# Patient Record
Sex: MALE | Race: WHITE | NOT HISPANIC OR LATINO | Employment: FULL TIME | URBAN - METROPOLITAN AREA
[De-identification: names, ages, dates, MRNs, and addresses within clinical notes are randomized per-mention and may not be internally consistent; named-entity substitution may affect disease eponyms.]

---

## 2016-12-26 PROCEDURE — 99223 1ST HOSP IP/OBS HIGH 75: CPT | Performed by: HOSPITALIST

## 2016-12-27 PROCEDURE — 99239 HOSP IP/OBS DSCHRG MGMT >30: CPT | Performed by: HOSPITALIST

## 2017-01-30 ENCOUNTER — APPOINTMENT (OUTPATIENT)
Dept: RADIOLOGY | Facility: MEDICAL CENTER | Age: 27
End: 2017-01-30
Attending: EMERGENCY MEDICINE
Payer: COMMERCIAL

## 2017-01-30 ENCOUNTER — HOSPITAL ENCOUNTER (EMERGENCY)
Facility: MEDICAL CENTER | Age: 27
End: 2017-01-31
Attending: EMERGENCY MEDICINE
Payer: COMMERCIAL

## 2017-01-30 DIAGNOSIS — J06.9 UPPER RESPIRATORY TRACT INFECTION, UNSPECIFIED TYPE: ICD-10-CM

## 2017-01-30 DIAGNOSIS — J45.21 MILD INTERMITTENT ASTHMA WITH ACUTE EXACERBATION: Primary | ICD-10-CM

## 2017-01-30 PROCEDURE — 700111 HCHG RX REV CODE 636 W/ 250 OVERRIDE (IP): Performed by: EMERGENCY MEDICINE

## 2017-01-30 PROCEDURE — 94640 AIRWAY INHALATION TREATMENT: CPT

## 2017-01-30 PROCEDURE — 99284 EMERGENCY DEPT VISIT MOD MDM: CPT

## 2017-01-30 PROCEDURE — 700101 HCHG RX REV CODE 250: Performed by: EMERGENCY MEDICINE

## 2017-01-30 PROCEDURE — 96372 THER/PROPH/DIAG INJ SC/IM: CPT

## 2017-01-30 PROCEDURE — 71010 DX-CHEST-PORTABLE (1 VIEW): CPT

## 2017-01-30 RX ORDER — METHYLPREDNISOLONE SODIUM SUCCINATE 125 MG/2ML
125 INJECTION, POWDER, LYOPHILIZED, FOR SOLUTION INTRAMUSCULAR; INTRAVENOUS ONCE
Status: COMPLETED | OUTPATIENT
Start: 2017-01-30 | End: 2017-01-30

## 2017-01-30 RX ORDER — IPRATROPIUM BROMIDE AND ALBUTEROL SULFATE 2.5; .5 MG/3ML; MG/3ML
3 SOLUTION RESPIRATORY (INHALATION)
Status: DISCONTINUED | OUTPATIENT
Start: 2017-01-30 | End: 2017-01-31 | Stop reason: HOSPADM

## 2017-01-30 RX ADMIN — METHYLPREDNISOLONE SODIUM SUCCINATE 125 MG: 125 INJECTION, POWDER, FOR SOLUTION INTRAMUSCULAR; INTRAVENOUS at 23:19

## 2017-01-30 RX ADMIN — IPRATROPIUM BROMIDE AND ALBUTEROL SULFATE 3 ML: .5; 3 SOLUTION RESPIRATORY (INHALATION) at 23:24

## 2017-01-30 NOTE — ED AVS SNAPSHOT
[employer] Access Code: [email or web address]  Expires: 2/19/2017 10:29 AM    Your email address is not on file at [facility].  Email Addresses are required for you to sign up for [employer], please contact [phone] to verify your personal information and to provide your email address prior to attempting to register for [employer].    [facility]ullenN[facility]  [address] [place] [address]    [employer]  A secure, online tool to manage your health information     [facility]’s [employer]® is a secure, online tool that connects you to your personalized health information from the privacy of your home -- day or night - making it very easy for you to manage your healthcare. Once the activation process is completed, you can even access your medical information using the [employer] lili, which is available for free in the Apple Lili store or Google Play store.     To learn more about [employer], visit [email or web address]    There are two levels of access available (as shown below):   My Chart Features  [facility] Primary Care Doctor [facility]  Specialists [facility]  Urgent  Care Non-[facility] Primary Care Doctor   Email your healthcare team securely and privately 24/7 X X X    Manage appointments: schedule your next appointment; view details of past/upcoming appointments X      Request prescription refills. X      View recent personal medical records, including lab and immunizations X X X X   View health record, including health history, allergies, medications X X X X   Read reports about your outpatient visits, procedures, consult and ER notes X X X X   See your discharge summary, which is a recap of your hospital and/or ER visit that includes your diagnosis, lab results, and care plan X X  X     How to register for [employer]:  Once your e-mail address has been verified, follow the following steps to sign up for [employer].     1. Go to  [email or web address]  2. Click on the Sign Up Now box, which takes you to the New Member Sign Up page. You  will need to provide the following information:  a. Enter your [employer] Access Code exactly as it appears at the top of this page. (You will not need to use this code after you’ve completed the sign-up process. If you do not sign up before the expiration date, you must request a new code.)   b. Enter your date of birth.   c. Enter your home email address.   d. Click Submit, and follow the next screen’s instructions.  3. Create a [employer] Focust ID. This will be your [employer] login ID and cannot be changed, so think of one that is secure and easy to remember.  4. Create a [employer] password. You can change your password at any time.  5. Enter your Password Reset Question and Answer. This can be used at a later time if you forget your password.   6. Enter your e-mail address. This allows you to receive e-mail notifications when new information is available in [employer].  7. Click Sign Up. You can now view your health information.    For assistance activating your [employer] account, call [phone]

## 2017-01-30 NOTE — ED AVS SNAPSHOT
After Visit Summary                                                                                                                Marcel Lozano   MRN: [id number]    Department:  [facility], Emergency Dept   Date of Visit:  1/30/2017            [facility], Emergency Dept    [address]    [place] [address]    Phone:  [phone]      You were seen by     Cele Fan D.O.      Your Diagnosis Was     Mild intermittent asthma with acute exacerbation     J45.21       These are the medications you received during your hospitalization from 01/30/2017 2121 to 01/31/2017 0019     Date/Time Order Dose Route Action    01/30/2017 2319 methylPREDNISolone sod succ (SOLU-MEDROL) 125 MG injection 125 mg 125 mg Intramuscular Given    01/30/2017 2324 ipratropium-albuterol (DUONEB) nebulizer solution 3 mL 3 mL Nebulization Given      Follow-up Information     1. Follow up with Alcides Cabello D.O. In 2 days.    Specialty:  Family Medicine    Contact information    [facility] [address] [facility]  [address]  [place] [address]  [phone]          2. Follow up with [facility] In 2 days.    Contact information    [place] [id number] [phone]          3. Follow up with PULMONARY LAB [facility] In 2 days.    Specialty:  Other    Contact information    [address] [facility] [address] [phone]      Medication Information     Review all of your home medications and newly ordered medications with your primary doctor and/or pharmacist as soon as possible. Follow medication instructions as directed by your doctor and/or pharmacist.     Please keep your complete medication list with you and share with your physician. Update the information when medications are discontinued, doses are changed, or new medications (including over-the-counter products) are added; and carry medication information at all times in the event of emergency situations.               Medication List         START taking these medications        Instructions    ipratropium 0.02 % Soln   Commonly known as:  ATROVENT    1 mL by Nebulization route every 6 hours as needed.   Dose:  0.2 mg       predniSONE 20 MG Tabs   Commonly known as:  DELTASONE    Take 2 Tabs by mouth every day for 5 days.   Dose:  40 mg         ASK your doctor about these medications        Instructions    * albuterol 108 (90 BASE) MCG/ACT Aers inhalation aerosol    Inhale 2 Puffs by mouth every 6 hours as needed for Shortness of Breath.   Dose:  2 Puff       * albuterol 2.5mg/0.5ml Nebu   Commonly known as:  PROVENTIL    Doctor's comments:  Bottle size per pharmacy or patient preference   0.5 mL by Nebulization route every four hours as needed for Shortness of Breath.   Dose:  2.5 mg       DAYQUIL/NYQUIL COLD/FLU RELIEF PO    Take  by mouth.       ibuprofen 400 MG Tabs   Commonly known as:  MOTRIN    Take 400 mg by mouth every 6 hours as needed.   Dose:  400 mg       * Notice:  This list has 2 medication(s) that are the same as other medications prescribed for you. Read the directions carefully, and ask your doctor or other care provider to review them with you.            Procedures and tests performed during your visit     DX-CHEST-PORTABLE (1 VIEW)    NPPB        Discharge Instructions       Follow-up with primary care this week for reevaluation, medication management and close blood pressure monitoring. A message has been left for the ED  for close follow-up with primary care and pulmonology. You can expect a phone call from them for appointment scheduling.    Resume all medications as previously indicated, including albuterol inhaled or nebulized, every 4-6 hours as needed for dyspnea or wheezing.  Add Atrovent every 6 hours as needed for persistent symptoms.  Prednisone daily for 5 days.    Return to the emergency department for difficulty breathing, wheezing, retractions, fever or other new concerns.    Asthma, Adult  Asthma is a  condition of the lungs in which the airways tighten and narrow. Asthma can make it hard to breathe. Asthma cannot be cured, but medicine and lifestyle changes can help control it. Asthma may be started (triggered) by:  · Animal skin flakes (dander).  · Dust.  · Cockroaches.  · Pollen.  · Mold.  · Smoke.  · Cleaning products.  · Hair sprays or aerosol sprays.  · Paint fumes or strong smells.  · Cold air, weather changes, and winds.  · Crying or laughing hard.  · Stress.  · Certain medicines or drugs.  · Foods, such as dried fruit, potato chips, and sparkling grape juice.  · Infections or conditions (colds, flu).  · Exercise.  · Certain medical conditions or diseases.  · Exercise or tiring activities.  HOME CARE   · Take medicine as told by your doctor.  · Use a peak flow meter as told by your doctor. A peak flow meter is a tool that measures how well the lungs are working.  · Record and keep track of the peak flow meter's readings.  · Understand and use the asthma action plan. An asthma action plan is a written plan for taking care of your asthma and treating your attacks.  · To help prevent asthma attacks:  ¨ Do not smoke. Stay away from secondhand smoke.  ¨ Change your heating and air conditioning filter often.  ¨ Limit your use of fireplaces and wood stoves.  ¨ Get rid of pests (such as roaches and mice) and their droppings.  ¨ Throw away plants if you see mold on them.  ¨ Clean your floors. Dust regularly. Use cleaning products that do not smell.  ¨ Have someone vacuum when you are not home. Use a vacuum  with a HEPA filter if possible.  ¨ Replace carpet with wood, tile, or vinyl michelle. Carpet can trap animal skin flakes and dust.  ¨ Use allergy-proof pillows, mattress covers, and box spring covers.  ¨ Wash bed sheets and blankets every week in hot water and dry them in a dryer.  ¨ Use blankets that are made of polyester or cotton.  ¨ Clean bathrooms and terri with bleach. If possible, have someone  repaint the walls in these rooms with mold-resistant paint. Keep out of the rooms that are being cleaned and painted.  ¨ Wash hands often.  GET HELP IF:  · You have make a whistling sound when breaking (wheeze), have shortness of breath, or have a cough even if taking medicine to prevent attacks.  · The colored mucus you cough up (sputum) is thicker than usual.  · The colored mucus you cough up changes from clear or white to yellow, green, gray, or bloody.  · You have problems from the medicine you are taking such as:  ¨ A rash.  ¨ Itching.  ¨ Swelling.  ¨ Trouble breathing.  · You need reliever medicines more than 2-3 times a week.  · Your peak flow measurement is still at 50-79% of your personal best after following the action plan for 1 hour.  · You have a fever.  GET HELP RIGHT AWAY IF:   · You seem to be worse and are not responding to medicine during an asthma attack.  · You are short of breath even at rest.  · You get short of breath when doing very little activity.  · You have trouble eating, drinking, or talking.  · You have chest pain.  · You have a fast heartbeat.  · Your lips or fingernails start to turn blue.  · You are light-headed, dizzy, or faint.  · Your peak flow is less than 50% of your personal best.  MAKE SURE YOU:   · Understand these instructions.  · Will watch your condition.  · Will get help right away if you are not doing well or get worse.     This information is not intended to replace advice given to you by your health care provider. Make sure you discuss any questions you have with your health care provider.     Document Released: 06/05/2009 Document Revised: 01/08/2016 Document Reviewed: 07/17/2014  [employer] Interactive Patient Education ©2016 [employer]            Patient Information     Patient Information    Following emergency treatment: all patient requiring follow-up care must return either to a private physician or a clinic if your condition worsens before you are able to  obtain further medical attention, please return to the emergency room.     Billing Information    At [facility], we work to make the billing process streamlined for our patients.  Our Representatives are here to answer any questions you may have regarding your hospital bill.  If you have insurance coverage and have supplied your insurance information to us, we will submit a claim to your insurer on your behalf.  Should you have any questions regarding your bill, we can be reached online or by phone as follows:  Online: You are able pay your bills online or live chat with our representatives about any billing questions you may have. We are here to help Monday - Friday from 8:00am to 7:30pm and 9:00am - 12:00pm on Saturdays.  Please visit [email or web address]  for more information.   Phone:  [phone] or [phone]    Please note that your emergency physician, surgeon, pathologist, radiologist, anesthesiologist, and other specialists are not employed by [facility] and will therefore bill separately for their services.  Please contact them directly for any questions concerning their bills at the numbers below:     Emergency Physician Services:  [phone]  [facility]:  [phone]  [facility]:  [phone]  [facility]:  [phone]    1. Your final bill may vary from the amount quoted upon discharge if all procedures are not complete at that time, or if your doctor has additional procedures of which we are not aware. You will receive an additional bill if you return to the Emergency Department at [facility] for suture removal regardless of the facility of which the sutures were placed.     2. Please arrange for settlement of this account at the emergency registration.    3. All self-pay accounts are due in full at the time of treatment.  If you are unable to meet this obligation then payment is expected within 4-5 days.      4. If you have had radiology studies (CT, X-ray, Ultrasound, MRI), you have received a preliminary result during your emergency department visit. Please contact the radiology department [phone] to receive a copy of your final result. Please discuss the Final result with your primary physician or with the follow up physician provided.     Crisis Hotline:  [place] Crisis Hotline:  5-388-QGZDQRK or 1-228.767.2481  [place] Crisis Hotline:    1-257.566.8719 or 333-679-4491         ED Discharge Follow Up Questions    1. In order to provide you with very good care, we would like to follow up with a phone call in the next few days.  May we have your permission to contact you?     YES /  NO    2. What is the best phone number to call you? (       )_____-__________    3. What is the best time to call you?      Morning  /  Afternoon  /  Evening                   Patient Signature:  ____________________________________________________________    Date:  ____________________________________________________________      Your appointments     Feb 17, 2017  3:45 PM   New Patient with An Fong D.O.   [facility])    [address] [place] [address], [address]  [facility] [id number] [phone]           Please bring Photo ID, Insurance Cards, All Medication Bottles and copies of any legal documents (such as Living Will, Power of ) If speaking a language besides English please bring an adult . Please arrive 30 minutes prior for check in and registration. You will be receiving a confirmation call a few days before your appointment from our automated call confirmation system.

## 2017-01-30 NOTE — ED AVS SNAPSHOT
1/31/2017          Marcel Lozano  [address]  [place] [address]    Dear Marcel Juárez:    [facility] wants to ensure your discharge home is safe and you or your loved ones have had all your questions answered regarding your care after you leave the hospital.    You may receive a telephone call within two days of your discharge.  This call is to make certain you understand your discharge instructions as well as ensure we provided you with the best care possible during your stay with us.     The call will only last approximately 3-5 minutes and will be done by a nurse.    Once again, we want to ensure your discharge home is safe and that you have a clear understanding of any next steps in your care.  If you have any questions or concerns, please do not hesitate to contact us, we are here for you.  Thank you for choosing [facility] for your healthcare needs.    Sincerely,    Alfredo Petit    [facility] [place]

## 2017-01-31 VITALS
RESPIRATION RATE: 18 BRPM | SYSTOLIC BLOOD PRESSURE: 132 MMHG | TEMPERATURE: 98.8 F | HEIGHT: 69 IN | HEART RATE: 88 BPM | OXYGEN SATURATION: 94 % | WEIGHT: 136.91 LBS | BODY MASS INDEX: 20.28 KG/M2 | DIASTOLIC BLOOD PRESSURE: 84 MMHG

## 2017-01-31 RX ORDER — PREDNISONE 20 MG/1
40 TABLET ORAL DAILY
Qty: 10 TAB | Refills: 0 | Status: ON HOLD | OUTPATIENT
Start: 2017-01-31 | End: 2017-02-03

## 2017-01-31 NOTE — ED NOTES
RT at the bedside. Pt states that he woke up this am SOB and has used his asthma medication (neb and inhaler) and that he hasn't been able to catch his breath. Pt states that he has gone through about 1/2 of his new inhaler. Pt states that when he got here his sob went away.

## 2017-01-31 NOTE — ED NOTES
Pt states that he is feeling better after the nebulizer. No auditory expiratory wheezes heard at this time. Pt placed on O2 monitor.

## 2017-01-31 NOTE — ED NOTES
Pt returned to waiting room pending bed availability. encouraged to notify staff of any chgs in status. Pt with audible bs ant and post  T/o with scattered insp /exp wheeze

## 2017-01-31 NOTE — ED NOTES
"Pt to er with c/o difficulty catching breath today. States has been using nebulizer \"all day today\" along with inhaler w/o chg. Pt quit vaping at [place] and states \"was born with asthma\". rm air sat=93% with pulse alos in 90's. Afebrile in triage  "

## 2017-01-31 NOTE — ED NOTES
Pt D/C to home. IV removed. D/C instructions and prescriptions given to patient. Pt verbalizes understanding. Pt leaves ED with no acute changes, complaints or concerns. Pt given work note for today 1/31/17. Pt ambulated out with a steady gait.

## 2017-01-31 NOTE — DISCHARGE INSTRUCTIONS
Follow-up with primary care this week for reevaluation, medication management and close blood pressure monitoring. A message has been left for the ED  for close follow-up with primary care and pulmonology. You can expect a phone call from them for appointment scheduling.    Resume all medications as previously indicated, including albuterol inhaled or nebulized, every 4-6 hours as needed for dyspnea or wheezing.  Add Atrovent every 6 hours as needed for persistent symptoms.  Prednisone daily for 5 days.    Return to the emergency department for difficulty breathing, wheezing, retractions, fever or other new concerns.    Asthma, Adult  Asthma is a condition of the lungs in which the airways tighten and narrow. Asthma can make it hard to breathe. Asthma cannot be cured, but medicine and lifestyle changes can help control it. Asthma may be started (triggered) by:  · Animal skin flakes (dander).  · Dust.  · Cockroaches.  · Pollen.  · Mold.  · Smoke.  · Cleaning products.  · Hair sprays or aerosol sprays.  · Paint fumes or strong smells.  · Cold air, weather changes, and winds.  · Crying or laughing hard.  · Stress.  · Certain medicines or drugs.  · Foods, such as dried fruit, potato chips, and sparkling grape juice.  · Infections or conditions (colds, flu).  · Exercise.  · Certain medical conditions or diseases.  · Exercise or tiring activities.  HOME CARE   · Take medicine as told by your doctor.  · Use a peak flow meter as told by your doctor. A peak flow meter is a tool that measures how well the lungs are working.  · Record and keep track of the peak flow meter's readings.  · Understand and use the asthma action plan. An asthma action plan is a written plan for taking care of your asthma and treating your attacks.  · To help prevent asthma attacks:  ¨ Do not smoke. Stay away from secondhand smoke.  ¨ Change your heating and air conditioning filter often.  ¨ Limit your use of fireplaces and wood  stoves.  ¨ Get rid of pests (such as roaches and mice) and their droppings.  ¨ Throw away plants if you see mold on them.  ¨ Clean your floors. Dust regularly. Use cleaning products that do not smell.  ¨ Have someone vacuum when you are not home. Use a vacuum  with a HEPA filter if possible.  ¨ Replace carpet with wood, tile, or vinyl michelle. Carpet can trap animal skin flakes and dust.  ¨ Use allergy-proof pillows, mattress covers, and box spring covers.  ¨ Wash bed sheets and blankets every week in hot water and dry them in a dryer.  ¨ Use blankets that are made of polyester or cotton.  ¨ Clean bathrooms and terri with bleach. If possible, have someone repaint the walls in these rooms with mold-resistant paint. Keep out of the rooms that are being cleaned and painted.  ¨ Wash hands often.  GET HELP IF:  · You have make a whistling sound when breaking (wheeze), have shortness of breath, or have a cough even if taking medicine to prevent attacks.  · The colored mucus you cough up (sputum) is thicker than usual.  · The colored mucus you cough up changes from clear or white to yellow, green, gray, or bloody.  · You have problems from the medicine you are taking such as:  ¨ A rash.  ¨ Itching.  ¨ Swelling.  ¨ Trouble breathing.  · You need reliever medicines more than 2-3 times a week.  · Your peak flow measurement is still at 50-79% of your personal best after following the action plan for 1 hour.  · You have a fever.  GET HELP RIGHT AWAY IF:   · You seem to be worse and are not responding to medicine during an asthma attack.  · You are short of breath even at rest.  · You get short of breath when doing very little activity.  · You have trouble eating, drinking, or talking.  · You have chest pain.  · You have a fast heartbeat.  · Your lips or fingernails start to turn blue.  · You are light-headed, dizzy, or faint.  · Your peak flow is less than 50% of your personal best.  MAKE SURE YOU:   · Understand  these instructions.  · Will watch your condition.  · Will get help right away if you are not doing well or get worse.     This information is not intended to replace advice given to you by your health care provider. Make sure you discuss any questions you have with your health care provider.     Document Released: 06/05/2009 Document Revised: 01/08/2016 Document Reviewed: 07/17/2014  [employer] Interactive Patient Education ©2016 [employer].

## 2017-01-31 NOTE — ED PROVIDER NOTES
"ED Provider Note    CHIEF COMPLAINT  Chief Complaint   Patient presents with   • Shortness of Breath       HPI  Marcel Franck Lozano is a 26 y.o. male who presents to the emergency department for difficulty breathing and wheezing. Patient with long history for asthma, admitted earlier this month for influenza A, pneumonia, sepsis. Patient states he completed all recommended antibiotics and he'll return to baseline until the last 2 days, most notably today with increased work of breathing, wheezing only minimally relieved with inhaled and nebulized albuterol at home. Patient does describe minimally productive, clear sputum, cough and nasal congestion as well. Patient denies fever or chills.    REVIEW OF SYSTEMS  See HPI for further details. All other systems are negative.     PAST MEDICAL HISTORY   has a past medical history of Asthma.    SOCIAL HISTORY  Social History     Social History Main Topics   • Smoking status: Former Smoker   • Smokeless tobacco: Never Used   • Alcohol Use: Yes      Comment: Rarely   • Drug Use: Yes     Special: Inhaled      Comment: marijuana   • Sexual Activity: Not on file       SURGICAL HISTORY  patient denies any surgical history    CURRENT MEDICATIONS  Home Medications     Reviewed by Isabella Estes R.N. (Registered Nurse) on 01/30/17 at 2146  Med List Status: Complete    Medication Last Dose Status    albuterol (PROVENTIL) 2.5mg/0.5ml Nebu Soln 1/30/2017 Active    albuterol 108 (90 BASE) MCG/ACT Aero Soln inhalation aerosol 1/30/2017 Active    ibuprofen (MOTRIN) 400 MG Tab 1/30/2017 Active    Pseudoeph-Doxylamine-DM-APAP (DAYQUIL/NYQUIL COLD/FLU RELIEF PO) 1/30/2017 Active                ALLERGIES  No Known Allergies    PHYSICAL EXAM  VITAL SIGNS: /84 mmHg  Pulse 73  Temp(Src) 37.1 °C (98.8 °F)  Resp 18  Ht 1.753 m (5' 9\")  Wt 62.1 kg (136 lb 14.5 oz)  BMI 20.21 kg/m2  SpO2 93%  Pulse ox interpretation: I interpret this pulse ox as normal.  Constitutional: Alert in " no apparent distress.  HENT: Normocephalic, atraumatic. Bilateral external ears normal, Nose normal. Moist mucous membranes.  TMs clear bilaterally. Oropharynx within normal limits, no erythema, edema or exudate.  Eyes: Pupils are equal and reactive, Conjunctiva normal.   Neck: Normal range of motion, Supple. No stridor or dysphonia. No meningeal irritation.  Lymphatic: No lymphadenopathy noted.   Cardiovascular: Regular rate and rhythm, no murmurs. Distal pulses intact.  No peripheral edema.  Thorax & Lungs: Coarse bilaterally, scattered expiratory wheezes. No increased work of breathing, clipped speech or retractions.  Skin: Warm, Dry, No erythema, No rash.   Musculoskeletal: Good range of motion in all major joints.   Neurologic: Alert, no gross focal deficit noted.  Psychiatric: Affect normal, Judgment normal, Mood normal.       DIAGNOSTIC STUDIES / PROCEDURES    RADIOLOGY  DX-CHEST-PORTABLE (1 VIEW)   Final Result      Negative single view of the chest.          COURSE & MEDICAL DECISION MAKING  Nursing notes and vital signs were reviewed. (See chart for details)  The patients records were reviewed, history was obtained from the patient ;     ED evaluation was consistent with mild acute asthma exacerbation, likely secondary to an upper respiratory infection, likely viral etiology. There is no clinical evidence for otitis media, pharyngitis, meningitis or pneumonia. Chest x-ray is unremarkable, notable resolution of the previously described bronchopneumonia. Symptomatology improved after a DuoNeb. Solu-Medrol has been given as well. Vital signs are stable without fever or tachycardia. Patient in no acute respiratory distress was never hypoxic.    Patient is stable for discharge at this time, anticipatory guidance provided, continue home medications including albuterol, Atrovent for nebulizer provided as well, prednisone for 5 days, close follow-up is encouraged, and strict ED return instructions have been  detailed. Patient is agreeable to the disposition and plan.    Patient's blood pressure was elevated in the emergency department, and has been referred to primary care for close monitoring.    FINAL IMPRESSION  (J45.21) Mild intermittent asthma with acute exacerbation  (primary encounter diagnosis)  (J06.9) Upper respiratory tract infection, unspecified type      Electronically signed by: Cele Fan, 1/31/2017 12:06 AM      This dictation was created using voice recognition software. The accuracy of the dictation is limited to the abilities of the software. I expect there may be some errors of grammar and possibly content. The nursing notes were reviewed and certain aspects of this information were incorporated into this note.

## 2017-02-01 ENCOUNTER — RESOLUTE PROFESSIONAL BILLING HOSPITAL PROF FEE (OUTPATIENT)
Dept: HOSPITALIST | Facility: MEDICAL CENTER | Age: 27
End: 2017-02-01
Payer: COMMERCIAL

## 2017-02-01 ENCOUNTER — HOSPITAL ENCOUNTER (OUTPATIENT)
Facility: MEDICAL CENTER | Age: 27
End: 2017-02-03
Attending: EMERGENCY MEDICINE | Admitting: INTERNAL MEDICINE
Payer: COMMERCIAL

## 2017-02-01 ENCOUNTER — APPOINTMENT (OUTPATIENT)
Dept: RADIOLOGY | Facility: MEDICAL CENTER | Age: 27
End: 2017-02-01
Attending: EMERGENCY MEDICINE
Payer: COMMERCIAL

## 2017-02-01 ENCOUNTER — PATIENT OUTREACH (OUTPATIENT)
Dept: HEALTH INFORMATION MANAGEMENT | Facility: OTHER | Age: 27
End: 2017-02-01

## 2017-02-01 DIAGNOSIS — J45.901 ASTHMA WITH ACUTE EXACERBATION, UNSPECIFIED ASTHMA SEVERITY: ICD-10-CM

## 2017-02-01 PROBLEM — J40 BRONCHITIS: Status: ACTIVE | Noted: 2017-02-01

## 2017-02-01 LAB
ANION GAP SERPL CALC-SCNC: 9 MMOL/L (ref 0–11.9)
BASOPHILS # BLD AUTO: 0.1 % (ref 0–1.8)
BASOPHILS # BLD: 0.03 K/UL (ref 0–0.12)
BUN SERPL-MCNC: 18 MG/DL (ref 8–22)
CALCIUM SERPL-MCNC: 9 MG/DL (ref 8.4–10.2)
CHLORIDE SERPL-SCNC: 103 MMOL/L (ref 96–112)
CO2 SERPL-SCNC: 25 MMOL/L (ref 20–33)
CREAT SERPL-MCNC: 1.08 MG/DL (ref 0.5–1.4)
EKG IMPRESSION: NORMAL
EOSINOPHIL # BLD AUTO: 0 K/UL (ref 0–0.51)
EOSINOPHIL NFR BLD: 0 % (ref 0–6.9)
ERYTHROCYTE [DISTWIDTH] IN BLOOD BY AUTOMATED COUNT: 43.5 FL (ref 35.9–50)
GFR SERPL CREATININE-BSD FRML MDRD: >60 ML/MIN/1.73 M 2
GLUCOSE SERPL-MCNC: 95 MG/DL (ref 65–99)
HCT VFR BLD AUTO: 42 % (ref 42–52)
HGB BLD-MCNC: 14.6 G/DL (ref 14–18)
IMM GRANULOCYTES # BLD AUTO: 0.1 K/UL (ref 0–0.11)
IMM GRANULOCYTES NFR BLD AUTO: 0.5 % (ref 0–0.9)
LYMPHOCYTES # BLD AUTO: 1.75 K/UL (ref 1–4.8)
LYMPHOCYTES NFR BLD: 8.5 % (ref 22–41)
MCH RBC QN AUTO: 30.4 PG (ref 27–33)
MCHC RBC AUTO-ENTMCNC: 34.8 G/DL (ref 33.7–35.3)
MCV RBC AUTO: 87.5 FL (ref 81.4–97.8)
MONOCYTES # BLD AUTO: 1 K/UL (ref 0–0.85)
MONOCYTES NFR BLD AUTO: 4.9 % (ref 0–13.4)
NEUTROPHILS # BLD AUTO: 17.68 K/UL (ref 1.82–7.42)
NEUTROPHILS NFR BLD: 86 % (ref 44–72)
NRBC # BLD AUTO: 0 K/UL
NRBC BLD AUTO-RTO: 0 /100 WBC
PLATELET # BLD AUTO: 250 K/UL (ref 164–446)
PMV BLD AUTO: 8.8 FL (ref 9–12.9)
POTASSIUM SERPL-SCNC: 3.7 MMOL/L (ref 3.6–5.5)
RBC # BLD AUTO: 4.8 M/UL (ref 4.7–6.1)
SODIUM SERPL-SCNC: 137 MMOL/L (ref 135–145)
WBC # BLD AUTO: 20.6 K/UL (ref 4.8–10.8)

## 2017-02-01 PROCEDURE — 99285 EMERGENCY DEPT VISIT HI MDM: CPT

## 2017-02-01 PROCEDURE — 99220 PR INITIAL OBSERVATION CARE,LEVL III: CPT | Performed by: INTERNAL MEDICINE

## 2017-02-01 PROCEDURE — 700111 HCHG RX REV CODE 636 W/ 250 OVERRIDE (IP): Performed by: INTERNAL MEDICINE

## 2017-02-01 PROCEDURE — 94640 AIRWAY INHALATION TREATMENT: CPT

## 2017-02-01 PROCEDURE — A9270 NON-COVERED ITEM OR SERVICE: HCPCS | Performed by: INTERNAL MEDICINE

## 2017-02-01 PROCEDURE — 700105 HCHG RX REV CODE 258: Performed by: INTERNAL MEDICINE

## 2017-02-01 PROCEDURE — 700102 HCHG RX REV CODE 250 W/ 637 OVERRIDE(OP): Performed by: INTERNAL MEDICINE

## 2017-02-01 PROCEDURE — 80048 BASIC METABOLIC PNL TOTAL CA: CPT

## 2017-02-01 PROCEDURE — 93005 ELECTROCARDIOGRAM TRACING: CPT

## 2017-02-01 PROCEDURE — 94760 N-INVAS EAR/PLS OXIMETRY 1: CPT

## 2017-02-01 PROCEDURE — 96376 TX/PRO/DX INJ SAME DRUG ADON: CPT

## 2017-02-01 PROCEDURE — 36415 COLL VENOUS BLD VENIPUNCTURE: CPT

## 2017-02-01 PROCEDURE — 96374 THER/PROPH/DIAG INJ IV PUSH: CPT

## 2017-02-01 PROCEDURE — G0378 HOSPITAL OBSERVATION PER HR: HCPCS

## 2017-02-01 PROCEDURE — 85025 COMPLETE CBC W/AUTO DIFF WBC: CPT

## 2017-02-01 PROCEDURE — 71010 DX-CHEST-PORTABLE (1 VIEW): CPT

## 2017-02-01 PROCEDURE — 700101 HCHG RX REV CODE 250: Performed by: INTERNAL MEDICINE

## 2017-02-01 PROCEDURE — 700101 HCHG RX REV CODE 250: Performed by: EMERGENCY MEDICINE

## 2017-02-01 RX ORDER — AMOXICILLIN 250 MG
1 CAPSULE ORAL NIGHTLY
Status: DISCONTINUED | OUTPATIENT
Start: 2017-02-01 | End: 2017-02-03 | Stop reason: HOSPADM

## 2017-02-01 RX ORDER — ONDANSETRON 4 MG/1
4 TABLET, ORALLY DISINTEGRATING ORAL EVERY 4 HOURS PRN
Status: DISCONTINUED | OUTPATIENT
Start: 2017-02-01 | End: 2017-02-03 | Stop reason: HOSPADM

## 2017-02-01 RX ORDER — ONDANSETRON 2 MG/ML
4 INJECTION INTRAMUSCULAR; INTRAVENOUS EVERY 4 HOURS PRN
Status: DISCONTINUED | OUTPATIENT
Start: 2017-02-01 | End: 2017-02-03 | Stop reason: HOSPADM

## 2017-02-01 RX ORDER — METHYLPREDNISOLONE SODIUM SUCCINATE 125 MG/2ML
62.5 INJECTION, POWDER, LYOPHILIZED, FOR SOLUTION INTRAMUSCULAR; INTRAVENOUS EVERY 6 HOURS
Status: DISCONTINUED | OUTPATIENT
Start: 2017-02-01 | End: 2017-02-03 | Stop reason: HOSPADM

## 2017-02-01 RX ORDER — IPRATROPIUM BROMIDE AND ALBUTEROL SULFATE 2.5; .5 MG/3ML; MG/3ML
3 SOLUTION RESPIRATORY (INHALATION)
Status: DISCONTINUED | OUTPATIENT
Start: 2017-02-01 | End: 2017-02-03 | Stop reason: HOSPADM

## 2017-02-01 RX ORDER — DOCUSATE SODIUM 100 MG/1
100 CAPSULE, LIQUID FILLED ORAL EVERY MORNING
Status: DISCONTINUED | OUTPATIENT
Start: 2017-02-01 | End: 2017-02-03 | Stop reason: HOSPADM

## 2017-02-01 RX ORDER — ALBUTEROL SULFATE 90 UG/1
2 AEROSOL, METERED RESPIRATORY (INHALATION)
Status: DISCONTINUED | OUTPATIENT
Start: 2017-02-01 | End: 2017-02-03 | Stop reason: HOSPADM

## 2017-02-01 RX ORDER — PREDNISONE 20 MG/1
40 TABLET ORAL DAILY
Status: DISCONTINUED | OUTPATIENT
Start: 2017-02-01 | End: 2017-02-01

## 2017-02-01 RX ORDER — PROMETHAZINE HYDROCHLORIDE 25 MG/1
12.5-25 SUPPOSITORY RECTAL EVERY 4 HOURS PRN
Status: DISCONTINUED | OUTPATIENT
Start: 2017-02-01 | End: 2017-02-03 | Stop reason: HOSPADM

## 2017-02-01 RX ORDER — PROMETHAZINE HYDROCHLORIDE 25 MG/1
12.5-25 TABLET ORAL EVERY 4 HOURS PRN
Status: DISCONTINUED | OUTPATIENT
Start: 2017-02-01 | End: 2017-02-03 | Stop reason: HOSPADM

## 2017-02-01 RX ORDER — AMOXICILLIN 250 MG
1 CAPSULE ORAL
Status: DISCONTINUED | OUTPATIENT
Start: 2017-02-01 | End: 2017-02-03 | Stop reason: HOSPADM

## 2017-02-01 RX ORDER — LACTULOSE 20 G/30ML
30 SOLUTION ORAL
Status: DISCONTINUED | OUTPATIENT
Start: 2017-02-01 | End: 2017-02-03 | Stop reason: HOSPADM

## 2017-02-01 RX ORDER — BISACODYL 10 MG
10 SUPPOSITORY, RECTAL RECTAL
Status: DISCONTINUED | OUTPATIENT
Start: 2017-02-01 | End: 2017-02-03 | Stop reason: HOSPADM

## 2017-02-01 RX ORDER — BENZONATATE 100 MG/1
100 CAPSULE ORAL 3 TIMES DAILY PRN
Status: DISCONTINUED | OUTPATIENT
Start: 2017-02-01 | End: 2017-02-03 | Stop reason: HOSPADM

## 2017-02-01 RX ORDER — SODIUM CHLORIDE 9 MG/ML
INJECTION, SOLUTION INTRAVENOUS CONTINUOUS
Status: DISCONTINUED | OUTPATIENT
Start: 2017-02-01 | End: 2017-02-02

## 2017-02-01 RX ORDER — ENEMA 19; 7 G/133ML; G/133ML
1 ENEMA RECTAL
Status: DISCONTINUED | OUTPATIENT
Start: 2017-02-01 | End: 2017-02-03 | Stop reason: HOSPADM

## 2017-02-01 RX ADMIN — METHYLPREDNISOLONE SODIUM SUCCINATE 62.5 MG: 125 INJECTION, POWDER, FOR SOLUTION INTRAMUSCULAR; INTRAVENOUS at 17:48

## 2017-02-01 RX ADMIN — IPRATROPIUM BROMIDE AND ALBUTEROL SULFATE 3 ML: .5; 3 SOLUTION RESPIRATORY (INHALATION) at 22:27

## 2017-02-01 RX ADMIN — ALBUTEROL SULFATE 2.5 MG: 2.5 SOLUTION RESPIRATORY (INHALATION) at 13:48

## 2017-02-01 RX ADMIN — ALBUTEROL SULFATE 10 MG: 2.5 SOLUTION RESPIRATORY (INHALATION) at 09:15

## 2017-02-01 RX ADMIN — SODIUM CHLORIDE: 9 INJECTION, SOLUTION INTRAVENOUS at 12:13

## 2017-02-01 RX ADMIN — IPRATROPIUM BROMIDE 0.5 MG: 0.5 SOLUTION RESPIRATORY (INHALATION) at 13:49

## 2017-02-01 RX ADMIN — METHYLPREDNISOLONE SODIUM SUCCINATE 62.5 MG: 125 INJECTION, POWDER, FOR SOLUTION INTRAMUSCULAR; INTRAVENOUS at 14:32

## 2017-02-01 RX ADMIN — ALBUTEROL SULFATE 10 MG: 2.5 SOLUTION RESPIRATORY (INHALATION) at 09:00

## 2017-02-01 RX ADMIN — IPRATROPIUM BROMIDE 0.5 MG: 0.5 SOLUTION RESPIRATORY (INHALATION) at 08:59

## 2017-02-01 RX ADMIN — IPRATROPIUM BROMIDE AND ALBUTEROL SULFATE 3 ML: .5; 3 SOLUTION RESPIRATORY (INHALATION) at 18:32

## 2017-02-01 ASSESSMENT — LIFESTYLE VARIABLES
TOTAL SCORE: 0
EVER HAD A DRINK FIRST THING IN THE MORNING TO STEADY YOUR NERVES TO GET RID OF A HANGOVER: NO
ON A TYPICAL DAY WHEN YOU DRINK ALCOHOL HOW MANY DRINKS DO YOU HAVE: 1
HAVE YOU EVER FELT YOU SHOULD CUT DOWN ON YOUR DRINKING: NO
EVER FELT BAD OR GUILTY ABOUT YOUR DRINKING: NO
ALCOHOL_USE: YES
EVER_SMOKED: YES
HOW MANY TIMES IN THE PAST YEAR HAVE YOU HAD 5 OR MORE DRINKS IN A DAY: 0
TOTAL SCORE: 0
TOTAL SCORE: 0
CONSUMPTION TOTAL: NEGATIVE
AVERAGE NUMBER OF DAYS PER WEEK YOU HAVE A DRINK CONTAINING ALCOHOL: 6
HAVE PEOPLE ANNOYED YOU BY CRITICIZING YOUR DRINKING: NO
EVER_SMOKED: YES

## 2017-02-01 ASSESSMENT — PAIN SCALES - GENERAL
PAINLEVEL_OUTOF10: 5
PAINLEVEL_OUTOF10: 0

## 2017-02-01 NOTE — ED PROVIDER NOTES
"ED Provider Note    CHIEF COMPLAINT  Chief Complaint   Patient presents with   • Shortness of Breath     Was seen yesterday for the same.     • Cough   • Chest Pressure       HPI  Marcel Franck Lozano is a 26 y.o. male who presents to the ED with shortness of breath and chest tightness. Hx asthma. Recent admission for CAP and also evaluated in ED yesterday for similar symptoms - discharged with Prednisone x 5 days and albuterol/atrovent nebulizer treatments. Reports this morning chest tightness and sob that did not improve significantly with nebulizer treatment. Reports symptoms seem to have improved after getting outside, but still feels tight. No productive cough or fevers/chills. Reports similar symptoms/episodes every winter season. No prior intubation.     REVIEW OF SYSTEMS  See HPI for further details. All other systems are negative.     PAST MEDICAL HISTORY   has a past medical history of Asthma.    SOCIAL HISTORY  Social History     Social History Main Topics   • Smoking status: Former Smoker   • Smokeless tobacco: Never Used   • Alcohol Use: Yes      Comment: Rarely   • Drug Use: Yes     Special: Inhaled      Comment: marijuana-weekends   • Sexual Activity: Not on file       SURGICAL HISTORY  patient denies any surgical history    CURRENT MEDICATIONS  Home Medications     **Home medications have not yet been reviewed for this encounter**          ALLERGIES  No Known Allergies    PHYSICAL EXAM  VITAL SIGNS: /55 mmHg  Pulse 106  Temp(Src) 37.3 °C (99.2 °F)  Resp 20  Ht 1.753 m (5' 9\")  Wt 62.1 kg (136 lb 14.5 oz)  BMI 20.21 kg/m2 @ABHILASH[[id number]::@   Pulse ox interpretation: I interpret this pulse ox as normal.  Constitutional: Alert in no apparent distress.  HENT: No signs of trauma, Bilateral external ears normal, Nose normal.   Eyes: Pupils are equal and reactive, Conjunctiva normal, Non-icteric.   Neck: Normal range of motion, No tenderness, Supple, No stridor. No JVD.  Lymphatic: No " lymphadenopathy noted.   Cardiovascular: Regular rate and rhythm, no murmurs.   Thorax & Lungs: No respiratory distress, Inspiratory and expiratory wheezes bilaterally, No chest tenderness.   Abdomen: Bowel sounds normal, Soft, No tenderness, No masses, No pulsatile masses. No peritoneal signs.  Skin: Warm, Dry, No erythema, No rash.   Back: No bony tenderness, No CVA tenderness.   Extremities: Intact distal pulses, No edema, No tenderness,   Musculoskeletal: Good range of motion in all major joints. No tenderness to palpation or major deformities noted.   Neurologic: Alert , Normal motor function, Normal sensory function, No focal deficits noted.   Psychiatric: Affect normal, Judgment normal, Mood normal.       DIAGNOSTIC STUDIES / PROCEDURES    EKG  EKG Interpretation-HR is 110 Sinus tachycardia. Normal QRS. No STEMI.     Chest Xray w/o acute process    +Leukocytosis likely related to steroid use    COURSE & MEDICAL DECISION MAKING  Pertinent Labs & Imaging studies reviewed. (See chart for details)    0900 Patient reports feeling better after continuous nebulizer, however, bilateral wheezes still remain and 02 saturation ~88-89% on RA. Recommended admission for further asthma treatment, however, patient may want to try to go home. Will decide after speaking with his work.    1014 Patient agreed to stay for further treatment. 02 saturation 94% on 2 liters NC.     No severe respiratory distress observed, however, persistent wheezing after continuous nebz and also 02 requirement. Will admit to medicine for further treatment.    The patient will not drink alcohol nor drive with prescribed medications. The patient will return for worsening symptoms and is stable at the time of discharge. The patient verbalizes understanding and will comply.    FINAL IMPRESSION  1. Asthma with acute exacerbation, unspecified asthma severity      Electronically signed by: Jorje Lowry, 2/1/2017 8:41 AM

## 2017-02-01 NOTE — IP AVS SNAPSHOT
2/3/2017          Marcel Lozano  [address]  [address]  [place] [address]    Dear Marcel Juárez:    [facility] wants to ensure your discharge home is safe and you or your loved ones have had all your questions answered regarding your care after you leave the hospital.    You may receive a telephone call within two days of your discharge.  This call is to make certain you understand your discharge instructions as well as ensure we provided you with the best care possible during your stay with us.     The call will only last approximately 3-5 minutes and will be done by a nurse.    Once again, we want to ensure your discharge home is safe and that you have a clear understanding of any next steps in your care.  If you have any questions or concerns, please do not hesitate to contact us, we are here for you.  Thank you for choosing [facility] for your healthcare needs.    Sincerely,    Alfredo Petit    [facility]

## 2017-02-01 NOTE — IP AVS SNAPSHOT
" After Visit Summary                                                                                                                  Name:Marcel Lozano  Medical Record Number:[id number]  CSN: [id number]    YOB: 1990   Age: 26 y.o.  Sex: male  HT:1.753 m (5' 9\") WT: 62 kg (136 lb 11 oz)          Admit Date: 2/1/2017     Discharge Date:   Today's Date: 2/3/2017  Attending Doctor:  Jeanette Osorio M.D.                  Allergies:  Cat hair extract and Mushroom extract complex            Discharge Instructions       Discharge Instructions    Discharged to home by car with relative. Discharged via walking, hospital escort: Yes.  Special equipment needed: Not Applicable    Be sure to schedule a follow-up appointment with your primary care doctor or any specialists as instructed.     Discharge Plan:   Diet Plan: Discussed  Activity Level: Discussed  Smoking Cessation Offered: Patient Counseled  Confirmed Follow up Appointment: Appointment Scheduled  Confirmed Symptoms Management: Discussed  Medication Reconciliation Updated: Yes  Influenza Vaccine Indication: Not indicated: Previously immunized this influenza season and > 8 years of age    I understand that a diet low in cholesterol, fat, and sodium is recommended for good health. Unless I have been given specific instructions below for another diet, I accept this instruction as my diet prescription.   Other diet: Keep yourself hydrated.    Special Instructions:   Asthma, F.L.A.R.E.  Most people with asthma do not get so sick that they need emergency care. The fact that you had to get emergency care may mean:  · You are not taking your long-term control medicine the right way.   · You have not been prescribed any or enough long-term control medicine.   · You are still exposed to triggers that start your asthma symptoms.   You can avoid asthma flare-ups by using this F.L.A.R.E. plan until you see your primary doctor.  F  FOLLOW UP WITH YOUR PRIMARY DOCTOR - " "CALL TO MAKE AN APPOINTMENT TO BE SEEN.  · If you have trouble making an appointment, ask to speak to the office nurse.   · If you do not have a primary care doctor, call to get one.   At the follow-up appointment:  · Bring all of your medications and this plan with you.   · Make an asthma action plan with your doctor that you can follow every day to keep your asthma under control.   · Write down your questions and your doctor's answers.   This will make your emergency visits rare.  L  LEARN ABOUT YOUR ASTHMA MEDICINES. TAKE ALL OF THESE MEDICINES JUST AS THE DOCTOR TELLS YOU, EVEN IF YOU ARE FEELING MUCH BETTER.  Quick-relief or Rescue  · Kind of medicine   · Name of medicine   · How much   · How often & how long you need to take it   Long-term control  · Kind of medicine   · Name of medicine   · How much   · How often & how long you need to take it   Steroid pills or syrup  · Kind of medicine   · Name of medicine   · How much   · How often & how long you need to take it   A  ASTHMA IS A LIFE-LONG (CHRONIC) DISEASE.  Even though your breathing is better after getting emergency care, you still need to get long-term control of your asthma. If you do not, you are at risk for more severe flare-ups and even death.  · If you use quick-relief medicine more than 2 times per week, your asthma is not under control. You need to see your doctor or an asthma specialist to make a plan to get control of your asthma.   · Take long-term control medicine every day as ordered by your doctor.   · Figure out what things make your asthma flare up and try to stay away from these \"triggers.\"   R  RESPOND TO THESE WARNING SIGNS THAT YOUR ASTHMA IS GETTING WORSE:  · Your chest feels tight.   · You are short of breath.   · You are wheezing.   · You are coughing.   · Your peak flow is getting low.   Keep taking your medicines as prescribed and call your doctor.  E  EMERGENCY CARE MAY BE NEEDED IF YOU:  · Have trouble talking.   · Are working " hard to breathe (may see skin sucking in at rib cage or above breast bone).   · Need to use quick-relief medicine more often than every 4 hours.   · See your peak flow dropping.   Take your quick-relief medicine and wait 20 minutes. If you do not feel better, take it again and wait 20 minutes. If you still do not feel better, take it again and call your doctor or 911 right away!  LEARN ABOUT ASTHMA  Asthma is a life-long disease that can make it hard for you to get air in and out of your lungs. Your asthma triggers make the air tubes in your lungs get smaller. These are the tubes that carry air in and out of your lungs.  Here is what happens:  · Small breathing tubes in your lungs swell and make extra mucus.   · Muscles around the small breathing tubes get tight and make them smaller.   · Smaller breathing tubes then get clogged with the extra mucus.   · Swelling, muscle tightness, and mucus make it harder for you to breathe. You start to cough and wheeze and your chest might feel tight.   Not all asthma flare-ups are the same. Some are worse than others. In a severe asthma flare-up, the breathing tubes get so small that air cannot get in and out of the lungs. People can die if their asthma flare-up is severe.  ASTHMA MEDICINES  · Quick-relief or Rescue medicine: should help for about 4 hours, relaxes the muscles around your breathing tubes so air can get in and out. If you need to take quick-relief medicine more than 2 times per week, your asthma is not under control, and you should ask your doctor about long-term control medicine.   · Long-term control medicine: must be taken every day in order to work right. It keeps your breathing tubes from swelling, and can prevent most asthma flare-ups. This medicine cannot stop a flare-up once it starts. During flare-ups, use quick-relief medicine right away and take your long-term control medicine as usual.   · Steroid pills or syrup: can help the swelling in your breathing  tubes go away. You must take this medicine just as the doctor tells you to. Do not skip a dose, and do not stop taking it unless a doctor tells you to stop.   TRIGGERS: TELL YOUR DOCTOR ABOUT THE THINGS THAT MAKE YOUR ASTHMA WORSE.  What started or triggered your asthma flare-up this time?  COMMON ASTHMA TRIGGERS:  · Breathing in chemicals, dusts, or fumes at work.   · Colds or flu.   · Animals.   · Dust.   · Pollen and mold.   · Strong odors.   · Weather.   · Exercise.   · Cigarette and other smoke.   · Medicines.   Smoking and secondhand smoke are asthma triggers. If you smoke, choose to quit. Never let others smoke near you or your children. Call your doctor or your health plan for help quitting.  FOR MORE INFORMATION  Asthma Initiative [facility]: www.GetAsthmaHelp.org  American Lung Association: www.lungusa.org  Asthma and Allergy Foundation of [place]: www.aafa.org  This plan and asthma information are based on the NAEPP Guidelines for the Diagnosis and Management of Asthma, (http://www.nhlbi.nig.gov/guidelines/asthma/asthgdln.htm).  Document Released: 07/26/2007 Document Revised: 03/11/2013 Document Reviewed: 10/04/2007  ExitCare® Patient Information ©2013 [employer].    · Is patient discharged on Warfarin / Coumadin?   No     · Is patient Post Blood Transfusion?  No    Depression / Suicide Risk    As you are discharged from this [facility] facility, it is important to learn how to keep safe from harming yourself.    Recognize the warning signs:  · Abrupt changes in personality, positive or negative- including increase in energy   · Giving away possessions  · Change in eating patterns- significant weight changes-  positive or negative  · Change in sleeping patterns- unable to sleep or sleeping all the time   · Unwillingness or inability to communicate  · Depression  · Unusual sadness, discouragement and loneliness  · Talk of wanting to die  · Neglect of personal appearance   · Rebelliousness- reckless  behavior  · Withdrawal from people/activities they love  · Confusion- inability to concentrate     If you or a loved one observes any of these behaviors or has concerns about self-harm, here's what you can do:  · Talk about it- your feelings and reasons for harming yourself  · Remove any means that you might use to hurt yourself (examples: pills, rope, extension cords, firearm)  · Get professional help from the community (Mental Health, Substance Abuse, psychological counseling)  · Do not be alone:Call your Safe Contact- someone whom you trust who will be there for you.  · Call your local CRISIS HOTLINE 186-0509 or 983-194-5746  · Call your local Children's Mobile Crisis Response Team [place] (058) 134-1058 or www.StreetFire  · Call the toll free National Suicide Prevention Hotlines   · National Suicide Prevention Lifeline 419-028-HEEE (0148)  · [employer] Hope Line Network 800-SUICIDE (654-6732)        Your appointments     Feb 17, 2017  3:45 PM   New Patient with An Fong D.O.   [facility] [place]    [address], [address]  [facility] [address] [phone]           Please bring Photo ID, Insurance Cards, All Medication Bottles and copies of any legal documents (such as Living Will, Power of ) If speaking a language besides English please bring an adult . Please arrive 30 minutes prior for check in and registration. You will be receiving a confirmation call a few days before your appointment from our automated call confirmation system.                 Discharge Medication Instructions:    Below are the medications your physician expects you to take upon discharge:    Review all your home medications and newly ordered medications with your doctor and/or pharmacist. Follow medication instructions as directed by your doctor and/or pharmacist.    Please keep your medication list with you and share with your physician.               Medication  List      START taking these medications        Instructions    benzonatate 100 MG Caps   Commonly known as:  TESSALON    Take 1 Cap by mouth 3 times a day as needed for Cough.   Dose:  100 mg       guaiFENesin 100 MG/5ML Soln   Commonly known as:  ROBITUSSIN    Take 10 mL by mouth every four hours as needed for Cough.   Dose:  10 mL         CHANGE how you take these medications        Instructions    * albuterol 108 (90 BASE) MCG/ACT Aers inhalation aerosol   What changed:  Another medication with the same name was added. Make sure you understand how and when to take each.    Inhale 2 Puffs by mouth every 6 hours as needed for Shortness of Breath.   Dose:  2 Puff       * albuterol 2.5mg/0.5ml Nebu   What changed:  Another medication with the same name was added. Make sure you understand how and when to take each.   Last time this was given:  2.5 mg on 2/1/2017  1:48 PM   Commonly known as:  PROVENTIL    Doctor's comments:  Bottle size per pharmacy or patient preference   0.5 mL by Nebulization route every four hours as needed for Shortness of Breath.   Dose:  2.5 mg       * albuterol 108 (90 BASE) MCG/ACT Aers inhalation aerosol   What changed:  You were already taking a medication with the same name, and this prescription was added. Make sure you understand how and when to take each.    Inhale 2 Puffs by mouth every 6 hours as needed for Shortness of Breath.   Dose:  2 Puff       * Notice:  This list has 3 medication(s) that are the same as other medications prescribed for you. Read the directions carefully, and ask your doctor or other care provider to review them with you.      CONTINUE taking these medications        Instructions    ipratropium 0.02 % Soln   Last time this was given:  0.5 mg on 2/1/2017  1:49 PM   Commonly known as:  ATROVENT    1 mL by Nebulization route every 6 hours as needed.   Dose:  0.2 mg       predniSONE 20 MG Tabs   Commonly known as:  DELTASONE    Take 2 Tabs by mouth every day for 5  days.   Dose:  40 mg         STOP taking these medications     DAYQUIL/NYQUIL COLD/FLU RELIEF PO               Instructions           Diet / Nutrition:    Follow any diet instructions given to you by your doctor or the dietician, including how much salt (sodium) you are allowed each day.    If you are overweight, talk to your doctor about a weight reduction plan.    Activity:    Remain physically active following your doctor's instructions about exercise and activity.    Rest often.     Any time you become even a little tired or short of breath, SIT DOWN and rest.    Worsening Symptoms:    Report any of the following signs and symptoms to the doctor's office immediately:    *Pain of jaw, arm, or neck  *Chest pain not relieved by medication                               *Dizziness or loss of consciousness  *Difficulty breathing even when at rest   *More tired than usual                                       *Bleeding drainage or swelling of surgical site  *Swelling of feet, ankles, legs or stomach                 *Fever (>100ºF)  *Pink or blood tinged sputum  *Weight gain (3lbs/day or 5lbs /week)           *Shock from internal defibrillator (if applicable)  *Palpitations or irregular heartbeats                *Cool and/or numb extremities    Stroke Awareness    Common Risk Factors for Stroke include:    Age  Atrial Fibrillation  Carotid Artery Stenosis  Diabetes Mellitus  Excessive alcohol consumption  High blood pressure  Overweight   Physical inactivity  Smoking    Warning signs and symptoms of a stroke include:    *Sudden numbness or weakness of the face, arm or leg (especially on one side of the body).  *Sudden confusion, trouble speaking or understanding.  *Sudden trouble seeing in one or both eyes.  *Sudden trouble walking, dizziness, loss of balance or coordination.Sudden severe headache with no known cause.    It is very important to get treatment quickly when a stroke occurs. If you experience any of the  above warning signs, call [phone] immediately.                   Disclaimer         Quit Smoking / Tobacco Use:    I understand the use of any tobacco products increases my chance of suffering from future heart disease or stroke and could cause other illnesses which may shorten my life. Quitting the use of tobacco products is the single most important thing I can do to improve my health. For further information on smoking / tobacco cessation call a Toll Free Quit Line at 1-992.384.1008 (*National Cancer [place]) or 1-472.409.6941 (American Lung Association) or you can access the web based program at www.lungusa.org.    [place] Tobacco Users Help Line:  (705) 987-4269       Toll Free: 1-584.566.1655  Quit Tobacco Program [facility] (437)319-5505    Crisis Hotline:    [place] Crisis Hotline:  7-833-TYLYIIT or 1-994.486.4938    [place] Crisis Hotline:    1-120.271.5664 or 685-595-9647    Discharge Survey:   Thank you for choosing [facility]. We hope we did everything we could to make your hospital stay a pleasant one. You may be receiving a phone survey and we would appreciate your time and participation in answering the questions. Your input is very valuable to us in our efforts to improve our service to our patients and their families.        My signature on this form indicates that:    1. I have reviewed and understand the above information.  2. My questions regarding this information have been answered to my satisfaction.  3. I have formulated a plan with my discharge nurse to obtain my prescribed medications for home.                  Disclaimer         __________________________________                     __________       ________                       Patient Signature                                                 Date                    Time

## 2017-02-01 NOTE — ED NOTES
The Medication Reconciliation has been completed per patient  Allergies have been reviewed  Antibiotic use in 30 days - NONE    Pharmacy:  [facility]

## 2017-02-01 NOTE — PROGRESS NOTES
Pt arrived from ED about 1145 and ambulated from [facility] to bed.  Admit profile and reviewed poc with patient and girlfriend.  Ordered lunch but pt had food allergy to mushroom; had to reorder as soup had mushrooms.  Pt states he has tightness in his chest, but has been worse with the cold weather.  IVF infusing and pt trying to rest now.

## 2017-02-01 NOTE — H&P
IDENTIFICATION:  The patient is a 26-year-old male patient with no primary   care provider.    CHIEF COMPLAINT:  Shortness of breath for 3 days.    HISTORY OF PRESENT ILLNESS:  The patient is a 26-year-old male who has a   history of asthma.  He states that for the last 3 days, he has had severe   shortness of breath and wheezing.  He has a nebulizer and inhalers at home and   he has used these, but has had no improvement in his breathing, so he comes   to the hospital today to be evaluated.  He denies any fevers, chills, nausea   or vomiting.  He does have some chest tightness, worse with inspiration.  He   did have influenza and pneumonia in December 2016 for which he required   hospitalization and treatment then.  He denies any tobacco use, but does state   he smokes marijuana on a regular basis.    REVIEW OF SYSTEMS:  A 10-point review of systems reviewed and otherwise   negative.    PAST MEDICAL HISTORY:  Asthma, influenza A, pneumonia.    OUTPATIENT MEDICATIONS:  Prednisone 40 mg daily, patient did start this   yesterday; Atrovent nebulized every 6 hours as needed for shortness of breath,   over-the-counter DayQuil as needed, albuterol 2 puffs every 6 hours as needed   for shortness of breath, albuterol nebulizer solution every 4 hours as needed   for shortness of breath.    DRUG ALLERGIES:  None.    SOCIAL HISTORY:  Patient denies any tobacco use or alcohol use.  He smokes   marijuana on a near daily basis.    FAMILY HISTORY:  No family history of lung disease.    PHYSICAL EXAMINATION:  VITAL SIGNS:  Temperature 99.2, blood pressure 118/55, heart rate 87,   respiratory rate 20, oxygen saturation 85% on room air.  GENERAL:  Patient is a pleasant gentleman.  He is sitting comfortably, in no   apparent distress.  HEENT:  Head atraumatic.  Extraocular movements intact.  Pupils equal, round,   reactive to light.  Sclerae clear.  Conjunctivae pink.  Mucous membranes   moist.  NECK:  Supple.  No jugular venous  distention.  Trachea midline.  No anterior,   posterior cervical or supraclavicular lymphadenopathy.  HEART:  Regular rate and rhythm with no murmur.  Point of maximal impulse   nondisplaced.  LUNGS:  Have loud expiratory wheezes bilaterally.  CHEST:  Symmetric with respiration.  No rhonchi.  ABDOMEN:  Soft, nontender, nondistended, normoactive bowel sounds.  No   palpable hepatosplenomegaly.  EXTREMITIES:  No clubbing, cyanosis or edema.  Pedal pulses 2+ bilaterally.  NEUROLOGIC:  Patient is alert and oriented x3.    LABORATORY DATA:  Sodium 137, potassium 3.7.  WBCs 20.6.    IMAGING:  Chest x-ray reviewed by myself shows no infiltrate or pneumothorax.    ASSESSMENT AND PLAN:  1.  Acute asthma exacerbation.  Patient will be admitted to the hospital and   treated with oral steroids.  I will treat him with regular breathing   treatments and nebulizer infusions and oxygen as needed.  2.  Acute respiratory failure with hypoxia.  I will order for supplemental   oxygen as needed.  3.  Marijuana smoking.  I did educate the patient the importance of   discontinuing his smoking of marijuana as although it is not tobacco, it is   still smoke being inhaled into his lungs and can cause bronchospasm.  4.  Patient is a full code.       ____________________________________     MD JESSICA RAMIREZ / KVNG    DD:  02/01/2017 12:17:56  DT:  02/01/2017 15:07:55    D#:  [id number]  Job#:  [id number]

## 2017-02-01 NOTE — CARE PLAN
Problem: Oxygenation:  Goal: Maintain adequate oxygenation dependent on patient condition  Outcome: PROGRESSING AS EXPECTED  Monitor oxygenation for SpO2 >90%  Intervention: Levels of oxygenation will improve to baseline  Patient did not require oxygen at home prior to this admission.       Problem: Bronchoconstriction:  Goal: Improve in air movement and diminished wheezing  Outcome: PROGRESSING AS EXPECTED  Patient does claim a benefit from bronchodilator therapy. There was a increase in aeration and a decrease in wheezes heard  Intervention: Implement inhaled treatments  Patient is receiving Duoneb Q4.  Intervention: Evaluate and manage medication effects  Patient will be evaluated before and after medication delivery to assess effectiveness of therapy. Currently there seems to be both subjective and objective improvement.

## 2017-02-01 NOTE — ED NOTES
Chief Complaint   Patient presents with   • Shortness of Breath     Was seen yesterday for the same.     • Cough   • Chest Pressure

## 2017-02-01 NOTE — IP AVS SNAPSHOT
" <p align=\"LEFT\"><IMG SRC=\"//[email or web address]$/[email or web address]\" alt=\"Image\" WIDTH=\"50%\" HEIGHT=\"200\" BORDER=\"\"></p>                   Name:Marcel Lozano  Medical Record Number:[id number]  CSN: [id number]    YOB: 1990   Age: 26 y.o.  Sex: male  HT:1.753 m (5' 9\") WT: 62 kg (136 lb 11 oz)          Admit Date: 2/1/2017     Discharge Date:   Today's Date: 2/3/2017  Attending Doctor:  Jeanette Osorio M.D.                  Allergies:  Cat hair extract and Mushroom extract complex          Your appointments     Feb 17, 2017  3:45 PM   New Patient with An Fong D.O.   [facility])    [address] [place] [address], [address]  [facility] [id number] [phone]           Please bring Photo ID, Insurance Cards, All Medication Bottles and copies of any legal documents (such as Living Will, Power of ) If speaking a language besides English please bring an adult . Please arrive 30 minutes prior for check in and registration. You will be receiving a confirmation call a few days before your appointment from our automated call confirmation system.                 Medication List      Take these Medications        Instructions    * albuterol 108 (90 BASE) MCG/ACT Aers inhalation aerosol   What changed:  Another medication with the same name was added. Make sure you understand how and when to take each.    Inhale 2 Puffs by mouth every 6 hours as needed for Shortness of Breath.   Dose:  2 Puff       * albuterol 2.5mg/0.5ml Nebu   What changed:  Another medication with the same name was added. Make sure you understand how and when to take each.   Commonly known as:  PROVENTIL    Doctor's comments:  Bottle size per pharmacy or patient preference   0.5 mL by Nebulization route every four hours as needed for Shortness of Breath.   Dose:  2.5 mg       * albuterol 108 (90 BASE) MCG/ACT Aers inhalation aerosol   What changed:  You were already taking a medication with the same " name, and this prescription was added. Make sure you understand how and when to take each.    Inhale 2 Puffs by mouth every 6 hours as needed for Shortness of Breath.   Dose:  2 Puff       benzonatate 100 MG Caps   Commonly known as:  TESSALON    Take 1 Cap by mouth 3 times a day as needed for Cough.   Dose:  100 mg       guaiFENesin 100 MG/5ML Soln   Commonly known as:  ROBITUSSIN    Take 10 mL by mouth every four hours as needed for Cough.   Dose:  10 mL       ipratropium 0.02 % Soln   Commonly known as:  ATROVENT    1 mL by Nebulization route every 6 hours as needed.   Dose:  0.2 mg       predniSONE 20 MG Tabs   Commonly known as:  DELTASONE    Take 2 Tabs by mouth every day for 5 days.   Dose:  40 mg       * Notice:  This list has 3 medication(s) that are the same as other medications prescribed for you. Read the directions carefully, and ask your doctor or other care provider to review them with you.

## 2017-02-01 NOTE — IP AVS SNAPSHOT
VTL Group Access Code: [email or web address]  Expires: 2/19/2017 10:29 AM    Your email address is not on file at [facility].  Email Addresses are required for you to sign up for VTL Group, please contact [phone] to verify your personal information and to provide your email address prior to attempting to register for VTL Group.    [facility]  [address] [facility]  [address] [place] [id number]    VTL Group  A secure, online tool to manage your health information     [facility]’s VTL Group® is a secure, online tool that connects you to your personalized health information from the privacy of your home -- day or night - making it very easy for you to manage your healthcare. Once the activation process is completed, you can even access your medical information using the VTL Group lili, which is available for free in the Apple Lili store or Google Play store.     To learn more about VTL Group, visit [email or web address]    There are two levels of access available (as shown below):   My Chart Features  [facility] Primary Care Doctor [facility]  Specialists [facility]  Urgent  Care Non-[facility] Primary Care Doctor   Email your healthcare team securely and privately 24/7 X X X    Manage appointments: schedule your next appointment; view details of past/upcoming appointments X      Request prescription refills. X      View recent personal medical records, including lab and immunizations X X X X   View health record, including health history, allergies, medications X X X X   Read reports about your outpatient visits, procedures, consult and ER notes X X X X   See your discharge summary, which is a recap of your hospital and/or ER visit that includes your diagnosis, lab results, and care plan X X  X     How to register for [employer]:  Once your e-mail address has been verified, follow the following steps to sign up for [employer].     1. Go to  [email or web address]  2. Click on the Sign Up Now box, which takes you to the New Member Sign Up  page. You will need to provide the following information:  a. Enter your [employer] Access Code exactly as it appears at the top of this page. (You will not need to use this code after you’ve completed the sign-up process. If you do not sign up before the expiration date, you must request a new code.)   b. Enter your date of birth.   c. Enter your home email address.   d. Click Submit, and follow the next screen’s instructions.  3. Create a [employer] ID. This will be your [employer] login ID and cannot be changed, so think of one that is secure and easy to remember.  4. Create a [employer] password. You can change your password at any time.  5. Enter your Password Reset Question and Answer. This can be used at a later time if you forget your password.   6. Enter your e-mail address. This allows you to receive e-mail notifications when new information is available in [employer].  7. Click Sign Up. You can now view your health information.    For assistance activating your [employer] account, call [phone]

## 2017-02-01 NOTE — ED NOTES
Pt states that he is feeling much better.  Wheezing noted in all lobes.  SPO2 at 88-94% on RA.  ERP at bs

## 2017-02-02 LAB
ANION GAP SERPL CALC-SCNC: 10 MMOL/L (ref 0–11.9)
BASOPHILS # BLD AUTO: 0.1 % (ref 0–1.8)
BASOPHILS # BLD: 0.01 K/UL (ref 0–0.12)
BUN SERPL-MCNC: 21 MG/DL (ref 8–22)
CALCIUM SERPL-MCNC: 8.6 MG/DL (ref 8.4–10.2)
CHLORIDE SERPL-SCNC: 105 MMOL/L (ref 96–112)
CO2 SERPL-SCNC: 22 MMOL/L (ref 20–33)
CREAT SERPL-MCNC: 0.91 MG/DL (ref 0.5–1.4)
EOSINOPHIL # BLD AUTO: 0 K/UL (ref 0–0.51)
EOSINOPHIL NFR BLD: 0 % (ref 0–6.9)
ERYTHROCYTE [DISTWIDTH] IN BLOOD BY AUTOMATED COUNT: 44.6 FL (ref 35.9–50)
GFR SERPL CREATININE-BSD FRML MDRD: >60 ML/MIN/1.73 M 2
GLUCOSE SERPL-MCNC: 130 MG/DL (ref 65–99)
HCT VFR BLD AUTO: 40.2 % (ref 42–52)
HGB BLD-MCNC: 13.8 G/DL (ref 14–18)
IMM GRANULOCYTES # BLD AUTO: 0.1 K/UL (ref 0–0.11)
IMM GRANULOCYTES NFR BLD AUTO: 0.7 % (ref 0–0.9)
LYMPHOCYTES # BLD AUTO: 0.65 K/UL (ref 1–4.8)
LYMPHOCYTES NFR BLD: 4.8 % (ref 22–41)
MCH RBC QN AUTO: 30.5 PG (ref 27–33)
MCHC RBC AUTO-ENTMCNC: 34.3 G/DL (ref 33.7–35.3)
MCV RBC AUTO: 88.7 FL (ref 81.4–97.8)
MONOCYTES # BLD AUTO: 0.09 K/UL (ref 0–0.85)
MONOCYTES NFR BLD AUTO: 0.7 % (ref 0–13.4)
NEUTROPHILS # BLD AUTO: 12.6 K/UL (ref 1.82–7.42)
NEUTROPHILS NFR BLD: 93.7 % (ref 44–72)
NRBC # BLD AUTO: 0 K/UL
NRBC BLD AUTO-RTO: 0 /100 WBC
PLATELET # BLD AUTO: 236 K/UL (ref 164–446)
PMV BLD AUTO: 9.5 FL (ref 9–12.9)
POTASSIUM SERPL-SCNC: 3.8 MMOL/L (ref 3.6–5.5)
RBC # BLD AUTO: 4.53 M/UL (ref 4.7–6.1)
SODIUM SERPL-SCNC: 137 MMOL/L (ref 135–145)
WBC # BLD AUTO: 13.5 K/UL (ref 4.8–10.8)

## 2017-02-02 PROCEDURE — 99406 BEHAV CHNG SMOKING 3-10 MIN: CPT

## 2017-02-02 PROCEDURE — 85025 COMPLETE CBC W/AUTO DIFF WBC: CPT

## 2017-02-02 PROCEDURE — 99225 PR SUBSEQUENT OBSERVATION CARE,LEVEL II: CPT | Performed by: INTERNAL MEDICINE

## 2017-02-02 PROCEDURE — 94760 N-INVAS EAR/PLS OXIMETRY 1: CPT

## 2017-02-02 PROCEDURE — 700101 HCHG RX REV CODE 250: Performed by: INTERNAL MEDICINE

## 2017-02-02 PROCEDURE — G0378 HOSPITAL OBSERVATION PER HR: HCPCS

## 2017-02-02 PROCEDURE — 94640 AIRWAY INHALATION TREATMENT: CPT

## 2017-02-02 PROCEDURE — 700111 HCHG RX REV CODE 636 W/ 250 OVERRIDE (IP): Performed by: INTERNAL MEDICINE

## 2017-02-02 PROCEDURE — 700105 HCHG RX REV CODE 258: Performed by: INTERNAL MEDICINE

## 2017-02-02 PROCEDURE — 80048 BASIC METABOLIC PNL TOTAL CA: CPT

## 2017-02-02 PROCEDURE — 96376 TX/PRO/DX INJ SAME DRUG ADON: CPT

## 2017-02-02 RX ADMIN — METHYLPREDNISOLONE SODIUM SUCCINATE 62.5 MG: 125 INJECTION, POWDER, FOR SOLUTION INTRAMUSCULAR; INTRAVENOUS at 11:43

## 2017-02-02 RX ADMIN — SODIUM CHLORIDE: 9 INJECTION, SOLUTION INTRAVENOUS at 06:17

## 2017-02-02 RX ADMIN — METHYLPREDNISOLONE SODIUM SUCCINATE 62.5 MG: 125 INJECTION, POWDER, FOR SOLUTION INTRAMUSCULAR; INTRAVENOUS at 00:56

## 2017-02-02 RX ADMIN — METHYLPREDNISOLONE SODIUM SUCCINATE 62.5 MG: 125 INJECTION, POWDER, FOR SOLUTION INTRAMUSCULAR; INTRAVENOUS at 17:31

## 2017-02-02 RX ADMIN — IPRATROPIUM BROMIDE AND ALBUTEROL SULFATE 3 ML: .5; 3 SOLUTION RESPIRATORY (INHALATION) at 22:31

## 2017-02-02 RX ADMIN — IPRATROPIUM BROMIDE AND ALBUTEROL SULFATE 3 ML: .5; 3 SOLUTION RESPIRATORY (INHALATION) at 10:11

## 2017-02-02 RX ADMIN — IPRATROPIUM BROMIDE AND ALBUTEROL SULFATE 3 ML: .5; 3 SOLUTION RESPIRATORY (INHALATION) at 02:08

## 2017-02-02 RX ADMIN — IPRATROPIUM BROMIDE AND ALBUTEROL SULFATE 3 ML: .5; 3 SOLUTION RESPIRATORY (INHALATION) at 06:28

## 2017-02-02 RX ADMIN — METHYLPREDNISOLONE SODIUM SUCCINATE 62.5 MG: 125 INJECTION, POWDER, FOR SOLUTION INTRAMUSCULAR; INTRAVENOUS at 23:21

## 2017-02-02 RX ADMIN — METHYLPREDNISOLONE SODIUM SUCCINATE 62.5 MG: 125 INJECTION, POWDER, FOR SOLUTION INTRAMUSCULAR; INTRAVENOUS at 06:15

## 2017-02-02 RX ADMIN — IPRATROPIUM BROMIDE AND ALBUTEROL SULFATE 3 ML: .5; 3 SOLUTION RESPIRATORY (INHALATION) at 18:50

## 2017-02-02 RX ADMIN — IPRATROPIUM BROMIDE AND ALBUTEROL SULFATE 3 ML: .5; 3 SOLUTION RESPIRATORY (INHALATION) at 14:16

## 2017-02-02 ASSESSMENT — ENCOUNTER SYMPTOMS
SORE THROAT: 0
ABDOMINAL PAIN: 0
NAUSEA: 0
WHEEZING: 1
FEVER: 0
MYALGIAS: 0
STRIDOR: 0
DIAPHORESIS: 0
SHORTNESS OF BREATH: 1
DIARRHEA: 0
COUGH: 1

## 2017-02-02 ASSESSMENT — PAIN SCALES - GENERAL: PAINLEVEL_OUTOF10: 0

## 2017-02-02 NOTE — FLOWSHEET NOTE
02/02/17 0629   Interdisciplinary Plan of Care-Goals (Indications)   Obstructive Ventilatory Defect or Pulmonary Disease without Obvious Obstruction History / Diagnosis   Interdisciplinary Plan of Care-Outcomes    Bronchodilator Outcome Patient at Stable Baseline   RT Assessment of Delivered Medications   Evaluation of Medication Delivery Daily Yes-- Pt /Family has been Instructed in use of Respiratory Medications and Adverse Reactions   SVN Group   #SVN Performed Yes   Given By: Mouthpiece   Date SVN Last Changed 02/01/17   Date SVN Next Change Due (Q 7 Days) 02/08/17   Chest Exam   Respiration 16   Pulse 96   Breath Sounds   Pre/Post Intervention Pre Intervention Assessment   RUL Breath Sounds Expiratory Wheezes   RML Breath Sounds Expiratory Wheezes   RLL Breath Sounds Expiratory Wheezes   DREW Breath Sounds Expiratory Wheezes   LLL Breath Sounds Expiratory Wheezes   Secretions   Cough Congested;Non Productive   Oximetry   #Pulse Oximetry (Single Determination) Yes   Oxygen   Pulse Oximetry 91 %   O2 (LPM) 2   O2 Daily Delivery Respiratory  Nasal Cannula

## 2017-02-02 NOTE — FLOWSHEET NOTE
02/01/17 2227   Interdisciplinary Plan of Care-Goals (Indications)   Obstructive Ventilatory Defect or Pulmonary Disease without Obvious Obstruction Physical Exam / Hyperinflation / Wheezing (bronchospasm)   Interdisciplinary Plan of Care-Outcomes    Bronchodilator Outcome Improvement in Airflow (peak flow, PFT)   Education   Education Yes - Pt. / Family has been Instructed in use of Respiratory Equipment   RT Assessment of Delivered Medications   Evaluation of Medication Delivery Daily Yes-- Pt /Family has been Instructed in use of Respiratory Medications and Adverse Reactions   SVN Group   #SVN Performed Yes   Given By: Mouthpiece   Chest Exam   Respiration 18   Pulse 85   Breath Sounds   Pre/Post Intervention Post Intervention Assessment   RUL Breath Sounds Expiratory Wheezes;Inspiratory Wheezes   RML Breath Sounds Expiratory Wheezes;Inspiratory Wheezes   RLL Breath Sounds Expiratory Wheezes;Inspiratory Wheezes   DREW Breath Sounds Expiratory Wheezes;Inspiratory Wheezes   LLL Breath Sounds Expiratory Wheezes;Inspiratory Wheezes   Oximetry   #Pulse Oximetry (Single Determination) Yes   Oxygen   Pulse Oximetry 96 %   O2 (LPM) 2   O2 Daily Delivery Respiratory  Room Air with O2 Available   Room Air Challenge Pass

## 2017-02-02 NOTE — DISCHARGE PLANNING
Medical Social Work    Referral: Pt discussed at IDT rounds this AM.    Intervention: Per flowsheet, pt lives with significant other and expects to d.c home.  No SS needs identified nor any requests for SUSANNE Childs during rounds.      Plan: SW available for any assistance with d.c planning.

## 2017-02-02 NOTE — CARE PLAN
Problem: Oxygenation:  Goal: Maintain adequate oxygenation dependent on patient condition  Outcome: PROGRESSING AS EXPECTED  Patient spends most of the day on RA with saturations of 90 to 92% and uses 2L only to catch his breath  Intervention: Manage oxygen therapy by monitoring pulse oximetry and/or ABG values  Titrate to maintain o2 saturations greater than 90%  Intervention: Levels of oxygenation will improve to baseline  No home o2      Problem: Bronchoconstriction:  Goal: Improve in air movement and diminished wheezing  Outcome: PROGRESSING AS EXPECTED  Patient is currently getting DUO Q4, patient has insp and exp wheezes, and is getting iv steroids  Intervention: Evaluate and manage medication effects  Patient has increased aeration post treatment and feels a lot better and is a lot better then yesterday. Wheezes stay consistent post breathing treatment

## 2017-02-02 NOTE — PROGRESS NOTES
Report received from Lissy IV bag changed as infusion was complete.  Morning assessment completed about 0710.  Pt states he is coughing less and the sob is improving.  Hearing more air exchanging compared to yesterday but more expiratory wheezes than inspiratory wheezes.  Will collaborate with RT about weaning from oxygen.  Also encouraged him to ambulate more today and see how he feels with activity as that was his big compliant coming in.  VSS.

## 2017-02-02 NOTE — FLOWSHEET NOTE
02/02/17 1014   Interdisciplinary Plan of Care-Goals (Indications)   Obstructive Ventilatory Defect or Pulmonary Disease without Obvious Obstruction History / Diagnosis   Interdisciplinary Plan of Care-Outcomes    Bronchodilator Outcome Patient at Stable Baseline   RT Assessment of Delivered Medications   Evaluation of Medication Delivery Daily Yes-- Pt /Family has been Instructed in use of Respiratory Medications and Adverse Reactions   SVN Group   #SVN Performed Yes   Given By: Mouthpiece   Respiratory WDL   Respiratory (WDL) X   Chest Exam   Respiration 16   Breath Sounds   Pre/Post Intervention Pre Intervention Assessment   RUL Breath Sounds Clear   RML Breath Sounds Clear   RLL Breath Sounds Expiratory Wheezes   DREW Breath Sounds Clear   LLL Breath Sounds Expiratory Wheezes   Secretions   Cough Congested;Non Productive   Oximetry   #Pulse Oximetry (Single Determination) Yes   Oxygen   Pulse Oximetry 95 %  (weaned to 1lpm)   O2 (LPM) 2   O2 Daily Delivery Respiratory  Nasal Cannula

## 2017-02-02 NOTE — FLOWSHEET NOTE
02/01/17 1832   Interdisciplinary Plan of Care-Goals (Indications)   Obstructive Ventilatory Defect or Pulmonary Disease without Obvious Obstruction Physical Exam / Hyperinflation / Wheezing (bronchospasm)   Interdisciplinary Plan of Care-Outcomes    Bronchodilator Outcome Improvement in Airflow (peak flow, PFT)   Education   Education Yes - Pt. / Family has been Instructed in use of Respiratory Equipment   RT Assessment of Delivered Medications   Evaluation of Medication Delivery Daily Yes-- Pt /Family has been Instructed in use of Respiratory Medications and Adverse Reactions   SVN Group   #SVN Performed Yes   Given By: Mouthpiece   Date SVN Last Changed 02/01/17   Date SVN Next Change Due (Q 7 Days) 02/08/17   Chest Exam   Respiration 16   Pulse (!) 106   Breath Sounds   Pre/Post Intervention Pre Intervention Assessment   RUL Breath Sounds Expiratory Wheezes;Inspiratory Wheezes   RML Breath Sounds Expiratory Wheezes;Inspiratory Wheezes   RLL Breath Sounds Expiratory Wheezes;Inspiratory Wheezes   DREW Breath Sounds Expiratory Wheezes;Inspiratory Wheezes   LLL Breath Sounds Expiratory Wheezes;Inspiratory Wheezes   Oximetry   #Pulse Oximetry (Single Determination) Yes   Oxygen   Pulse Oximetry 91 %   O2 (LPM) 0   O2 Daily Delivery Respiratory  Room Air with O2 Available   Room Air Challenge Pass

## 2017-02-02 NOTE — CARE PLAN
Problem: Knowledge Deficit  Goal: Knowledge of disease process/condition, treatment plan, diagnostic tests, and medications will improve  Outcome: PROGRESSING AS EXPECTED  Discussed POC w/ patient. Addressed any questions about diagnosis, POC and hospital stay. Pt is resting comfortably, call light w/in reach. Will continue to assess.     Problem: Respiratory:  Goal: Respiratory status will improve  Outcome: PROGRESSING AS EXPECTED  Pt on RT protocol. Receiving breathing tx q4 and IV steroids q6 hrs.

## 2017-02-02 NOTE — FLOWSHEET NOTE
02/02/17 1417   Interdisciplinary Plan of Care-Goals (Indications)   Obstructive Ventilatory Defect or Pulmonary Disease without Obvious Obstruction History / Diagnosis   Interdisciplinary Plan of Care-Outcomes    Bronchodilator Outcome Patient at Stable Baseline   RT Assessment of Delivered Medications   Evaluation of Medication Delivery Daily Yes-- Pt /Family has been Instructed in use of Respiratory Medications and Adverse Reactions   SVN Group   #SVN Performed Yes   Given By: Mouthpiece   Respiratory WDL   Respiratory (WDL) X   Chest Exam   Respiration 16   Pulse 76   Breath Sounds   Pre/Post Intervention Pre Intervention Assessment   RUL Breath Sounds Expiratory Wheezes   RML Breath Sounds Expiratory Wheezes   RLL Breath Sounds Diminished;Expiratory Wheezes   DREW Breath Sounds Expiratory Wheezes;Inspiratory Wheezes   LLL Breath Sounds Diminished;Expiratory Wheezes;Inspiratory Wheezes   Secretions   Cough Congested;Non Productive   Oximetry   #Pulse Oximetry (Single Determination) Yes   Oxygen   Pulse Oximetry 95 %   O2 (LPM) 1   O2 Daily Delivery Respiratory  Nasal Cannula

## 2017-02-02 NOTE — PROGRESS NOTES
Received bedside report from ADITI Brannon. Assumed care of pt who is awake, sitting up in bed, RR even/unlabored. On 2 L NC, no s/sx of respiratory distress noted. Call light within reach.  Will CTM.     2100 Assessment complete. Family and friends at BS. Reviewed POC; pt verbalizes understanding. Pt resting comfortably in bed; states no needs at this time. Bed in lowest position, call light w/in reach, will CTM.     0200 VSS. No s/sx of distress noted. Will CTM.     0700 Patient report given to ADITI Brannon. Safety precautions in place. Pt stable; no s/sx of distress noted.

## 2017-02-03 VITALS
DIASTOLIC BLOOD PRESSURE: 67 MMHG | RESPIRATION RATE: 18 BRPM | BODY MASS INDEX: 20.24 KG/M2 | HEIGHT: 69 IN | OXYGEN SATURATION: 88 % | WEIGHT: 136.69 LBS | SYSTOLIC BLOOD PRESSURE: 125 MMHG | TEMPERATURE: 97.5 F | HEART RATE: 78 BPM

## 2017-02-03 PROCEDURE — 90732 PPSV23 VACC 2 YRS+ SUBQ/IM: CPT | Performed by: INTERNAL MEDICINE

## 2017-02-03 PROCEDURE — 700111 HCHG RX REV CODE 636 W/ 250 OVERRIDE (IP): Performed by: INTERNAL MEDICINE

## 2017-02-03 PROCEDURE — G0378 HOSPITAL OBSERVATION PER HR: HCPCS

## 2017-02-03 PROCEDURE — 99217 PR OBSERVATION CARE DISCHARGE: CPT | Performed by: INTERNAL MEDICINE

## 2017-02-03 PROCEDURE — 700101 HCHG RX REV CODE 250: Performed by: INTERNAL MEDICINE

## 2017-02-03 PROCEDURE — 96376 TX/PRO/DX INJ SAME DRUG ADON: CPT

## 2017-02-03 PROCEDURE — 94640 AIRWAY INHALATION TREATMENT: CPT

## 2017-02-03 PROCEDURE — 94760 N-INVAS EAR/PLS OXIMETRY 1: CPT

## 2017-02-03 PROCEDURE — 90471 IMMUNIZATION ADMIN: CPT

## 2017-02-03 RX ORDER — ALBUTEROL SULFATE 90 UG/1
2 AEROSOL, METERED RESPIRATORY (INHALATION) EVERY 6 HOURS PRN
Qty: 8.5 G | Refills: 3 | Status: SHIPPED | OUTPATIENT
Start: 2017-02-03 | End: 2017-03-14

## 2017-02-03 RX ORDER — BENZONATATE 100 MG/1
100 CAPSULE ORAL 3 TIMES DAILY PRN
Qty: 60 CAP | Refills: 0 | Status: SHIPPED | OUTPATIENT
Start: 2017-02-03 | End: 2017-03-14

## 2017-02-03 RX ORDER — PREDNISONE 20 MG/1
40 TABLET ORAL DAILY
Qty: 10 TAB | Refills: 0 | Status: SHIPPED | OUTPATIENT
Start: 2017-02-03 | End: 2017-02-08

## 2017-02-03 RX ADMIN — IPRATROPIUM BROMIDE AND ALBUTEROL SULFATE 3 ML: .5; 3 SOLUTION RESPIRATORY (INHALATION) at 05:12

## 2017-02-03 RX ADMIN — METHYLPREDNISOLONE SODIUM SUCCINATE 62.5 MG: 125 INJECTION, POWDER, FOR SOLUTION INTRAMUSCULAR; INTRAVENOUS at 05:57

## 2017-02-03 RX ADMIN — PNEUMOCOCCAL VACCINE POLYVALENT 25 MCG
25; 25; 25; 25; 25; 25; 25; 25; 25; 25; 25; 25; 25; 25; 25; 25; 25; 25; 25; 25; 25; 25; 25 INJECTION, SOLUTION INTRAMUSCULAR; SUBCUTANEOUS at 10:25

## 2017-02-03 NOTE — PROGRESS NOTES
Received report from day shift RN; care assumed. Pt is resting in bed, receiving breathing treatment; visitor is at the BS-- denies any needs. CLIP, pt calling for assistance, fall precautions in place, will CTM.

## 2017-02-03 NOTE — PROGRESS NOTES
Report received from Rain, pt weaned from his oxygen throughout the night.  Pt sleeping during report and awoke for breakfast.  After breakfast CNA ambulated pt down the hallways and he only desated to 88% and states sob is gone.  During this walk, Navya, hospitalist RN, spoke to him about f/u appt, discharging today, rescue inhalers and what he does for work; during rounds will f/u with these orders for discharge.  Expiratory wheezes only heard on the right side of his lungs.

## 2017-02-03 NOTE — DISCHARGE INSTRUCTIONS
Discharge Instructions    Discharged to home by car with relative. Discharged via walking, hospital escort: Yes.  Special equipment needed: Not Applicable    Be sure to schedule a follow-up appointment with your primary care doctor or any specialists as instructed.     Discharge Plan:   Diet Plan: Discussed  Activity Level: Discussed  Smoking Cessation Offered: Patient Counseled  Confirmed Follow up Appointment: Appointment Scheduled  Confirmed Symptoms Management: Discussed  Medication Reconciliation Updated: Yes  Influenza Vaccine Indication: Not indicated: Previously immunized this influenza season and > 8 years of age    I understand that a diet low in cholesterol, fat, and sodium is recommended for good health. Unless I have been given specific instructions below for another diet, I accept this instruction as my diet prescription.   Other diet: Keep yourself hydrated.    Special Instructions:   Asthma, F.L.A.R.E.  Most people with asthma do not get so sick that they need emergency care. The fact that you had to get emergency care may mean:  · You are not taking your long-term control medicine the right way.   · You have not been prescribed any or enough long-term control medicine.   · You are still exposed to triggers that start your asthma symptoms.   You can avoid asthma flare-ups by using this F.L.A.R.E. plan until you see your primary doctor.  F  FOLLOW UP WITH YOUR PRIMARY DOCTOR - CALL TO MAKE AN APPOINTMENT TO BE SEEN.  · If you have trouble making an appointment, ask to speak to the office nurse.   · If you do not have a primary care doctor, call to get one.   At the follow-up appointment:  · Bring all of your medications and this plan with you.   · Make an asthma action plan with your doctor that you can follow every day to keep your asthma under control.   · Write down your questions and your doctor's answers.   This will make your emergency visits rare.  L  LEARN ABOUT YOUR ASTHMA MEDICINES. TAKE ALL  "OF THESE MEDICINES JUST AS THE DOCTOR TELLS YOU, EVEN IF YOU ARE FEELING MUCH BETTER.  Quick-relief or Rescue  · Kind of medicine   · Name of medicine   · How much   · How often & how long you need to take it   Long-term control  · Kind of medicine   · Name of medicine   · How much   · How often & how long you need to take it   Steroid pills or syrup  · Kind of medicine   · Name of medicine   · How much   · How often & how long you need to take it   A  ASTHMA IS A LIFE-LONG (CHRONIC) DISEASE.  Even though your breathing is better after getting emergency care, you still need to get long-term control of your asthma. If you do not, you are at risk for more severe flare-ups and even death.  · If you use quick-relief medicine more than 2 times per week, your asthma is not under control. You need to see your doctor or an asthma specialist to make a plan to get control of your asthma.   · Take long-term control medicine every day as ordered by your doctor.   · Figure out what things make your asthma flare up and try to stay away from these \"triggers.\"   R  RESPOND TO THESE WARNING SIGNS THAT YOUR ASTHMA IS GETTING WORSE:  · Your chest feels tight.   · You are short of breath.   · You are wheezing.   · You are coughing.   · Your peak flow is getting low.   Keep taking your medicines as prescribed and call your doctor.  E  EMERGENCY CARE MAY BE NEEDED IF YOU:  · Have trouble talking.   · Are working hard to breathe (may see skin sucking in at rib cage or above breast bone).   · Need to use quick-relief medicine more often than every 4 hours.   · See your peak flow dropping.   Take your quick-relief medicine and wait 20 minutes. If you do not feel better, take it again and wait 20 minutes. If you still do not feel better, take it again and call your doctor or 911 right away!  LEARN ABOUT ASTHMA  Asthma is a life-long disease that can make it hard for you to get air in and out of your lungs. Your asthma triggers make the air " tubes in your lungs get smaller. These are the tubes that carry air in and out of your lungs.  Here is what happens:  · Small breathing tubes in your lungs swell and make extra mucus.   · Muscles around the small breathing tubes get tight and make them smaller.   · Smaller breathing tubes then get clogged with the extra mucus.   · Swelling, muscle tightness, and mucus make it harder for you to breathe. You start to cough and wheeze and your chest might feel tight.   Not all asthma flare-ups are the same. Some are worse than others. In a severe asthma flare-up, the breathing tubes get so small that air cannot get in and out of the lungs. People can die if their asthma flare-up is severe.  ASTHMA MEDICINES  · Quick-relief or Rescue medicine: should help for about 4 hours, relaxes the muscles around your breathing tubes so air can get in and out. If you need to take quick-relief medicine more than 2 times per week, your asthma is not under control, and you should ask your doctor about long-term control medicine.   · Long-term control medicine: must be taken every day in order to work right. It keeps your breathing tubes from swelling, and can prevent most asthma flare-ups. This medicine cannot stop a flare-up once it starts. During flare-ups, use quick-relief medicine right away and take your long-term control medicine as usual.   · Steroid pills or syrup: can help the swelling in your breathing tubes go away. You must take this medicine just as the doctor tells you to. Do not skip a dose, and do not stop taking it unless a doctor tells you to stop.   TRIGGERS: TELL YOUR DOCTOR ABOUT THE THINGS THAT MAKE YOUR ASTHMA WORSE.  What started or triggered your asthma flare-up this time?  COMMON ASTHMA TRIGGERS:  · Breathing in chemicals, dusts, or fumes at work.   · Colds or flu.   · Animals.   · Dust.   · Pollen and mold.   · Strong odors.   · Weather.   · Exercise.   · Cigarette and other smoke.   · Medicines.   Smoking  and secondhand smoke are asthma triggers. If you smoke, choose to quit. Never let others smoke near you or your children. Call your doctor or your health plan for help quitting.  FOR MORE INFORMATION  Asthma Initiative [facility]: www.GetAsthmaHelp.org  American Lung Association: www.lungusa.org  Asthma and Allergy Foundation of [place]: www.aafa.org  This plan and asthma information are based on the NAEPP Guidelines for the Diagnosis and Management of Asthma, (http://www.nhlbi.nig.gov/guidelines/asthma/asthgdln.htm).  Document Released: 07/26/2007 Document Revised: 03/11/2013 Document Reviewed: 10/04/2007  ExitCare® Patient Information ©2013 [employer].    · Is patient discharged on Warfarin / Coumadin?   No     · Is patient Post Blood Transfusion?  No    Depression / Suicide Risk    As you are discharged from this [facility] facility, it is important to learn how to keep safe from harming yourself.    Recognize the warning signs:  · Abrupt changes in personality, positive or negative- including increase in energy   · Giving away possessions  · Change in eating patterns- significant weight changes-  positive or negative  · Change in sleeping patterns- unable to sleep or sleeping all the time   · Unwillingness or inability to communicate  · Depression  · Unusual sadness, discouragement and loneliness  · Talk of wanting to die  · Neglect of personal appearance   · Rebelliousness- reckless behavior  · Withdrawal from people/activities they love  · Confusion- inability to concentrate     If you or a loved one observes any of these behaviors or has concerns about self-harm, here's what you can do:  · Talk about it- your feelings and reasons for harming yourself  · Remove any means that you might use to hurt yourself (examples: pills, rope, extension cords, firearm)  · Get professional help from the community (Mental Health, Substance Abuse, psychological counseling)  · Do not be alone:Call your Safe Contact-  someone whom you trust who will be there for you.  · Call your local CRISIS HOTLINE 395-8042 or 807-648-3599  · Call your local Children's Mobile Crisis Response Team [place] (852) 987-0229 or www.Meituan.com  · Call the toll free National Suicide Prevention Hotlines   · National Suicide Prevention Lifeline 542-903-TZJL (7382)  · [employer] Line Network 800-SUICIDE (220-0301)

## 2017-02-03 NOTE — RESPIRATORY CARE
"COPD EDUCATION by COPD CLINICAL EDUCATOR  2/2/2017 at 12:00 PM by Jade Stern     Patient interviewed by COPD education team. Patient refused Asthma information at this time.  Patient received Asthma education on last admit 12/28/16. Patient states he is aware he needs to quit smoking marijuana. He states he already quit smoking cigarettes and e-cigarettes the last time he was admitted.    Smoking Cessation Intervention 3 -10 minutes completed.     Provided smoking cessation packet with \"Tips to Quit\" and flyer for \"Free Smoking Cessation Classes\". Provided \"Quit Card\" with the phone number to [place] [facility] for 8 weeks of free nicotine replacement therapy.     Provided coupon for [place].  "

## 2017-02-03 NOTE — PROGRESS NOTES
Pt is a&oX4, pleasant and cooperative. Denies any pain. Pt's has expiratory wheezes but verbalized his breathing is much better and wheezing has improved. Attempted to wean pt off oxygen but was 89-90% on room air-- placed back on 1 L/min (95-99%). Denies other needs and has a steady gate. Call light in reach-- monitoring.

## 2017-02-03 NOTE — PROGRESS NOTES
Unsuccessful in weaning from oxygen, but have him down to 1 lt/min.  Pt showered this afternoon and he states he feels better.  VSS.  Iv has been stopped and pt encouraged to drink orally.

## 2017-02-03 NOTE — CARE PLAN
Problem: Safety  Goal: Will remain free from falls  Outcome: PROGRESSING AS EXPECTED  Pt educated regarding the use of call light when needing assistance. Pt demonstrated and verbalized the proper use of a call light and to call for assistance. Fall precautions in place, treaded slippers on, personal belongings/phone in reach. Pt has a steady gate ambulating independently and is encouraged to call if assistance is needed.     Problem: Respiratory:  Goal: Respiratory status will improve  Outcome: PROGRESSING SLOWER THAN EXPECTED  Unable to wean off of supplemental oxygen tonight. Pt was 89% on room air.

## 2017-02-03 NOTE — PROGRESS NOTES
NO acute changes in pt status. Pt is sleeping with RR even and unlabored. Call light in reach-- monitoring.

## 2017-02-03 NOTE — PROGRESS NOTES
Hospital Medicine Progress Note, Adult, Complex               Author: Jeanette Osorio Date & Time created: 2/2/2017  5:00 PM     Interval History:  The patient was admitted with wheezing and hypoxia consistent with acute asthma exacerbation  Today he continues to be short of breath and oxygen saturation is 91% with 2 liters supplemental oxygen and he has accessory muscle use with exertion    Review of Systems:  Review of Systems   Constitutional: Negative for fever and diaphoresis.   HENT: Negative for congestion and sore throat.    Respiratory: Positive for cough, shortness of breath and wheezing. Negative for stridor.    Cardiovascular: Negative for chest pain and leg swelling.   Gastrointestinal: Negative for nausea, abdominal pain and diarrhea.   Genitourinary: Negative for dysuria.   Musculoskeletal: Negative for myalgias.   Skin: Negative for itching and rash.       Physical Exam:  Physical Exam   Constitutional: He is oriented to person, place, and time. No distress.   HENT:   Mouth/Throat: Oropharynx is clear and moist.   Cardiovascular: Normal rate and regular rhythm.    No murmur heard.  Pulmonary/Chest: He is in respiratory distress. He has wheezes. He has no rales.   Abdominal: Soft. Bowel sounds are normal.   Musculoskeletal: He exhibits no edema.   Neurological: He is alert and oriented to person, place, and time. Coordination normal.   Skin: Skin is warm. No rash noted.   Psychiatric: His behavior is normal.   Nursing note and vitals reviewed.      Labs:        Invalid input(s): [id number]      Recent Labs      02/01/17   1037  02/02/17   0248   SODIUM  137  137   POTASSIUM  3.7  3.8   CHLORIDE  103  105   CO2  25  22   BUN  18  21   CREATININE  1.08  0.91   CALCIUM  9.0  8.6     Recent Labs      02/01/17   1037  02/02/17   0248   GLUCOSE  95  130*     Recent Labs      02/01/17   1037  02/02/17   0248   RBC  4.80  4.53*   HEMOGLOBIN  14.6  13.8*   HEMATOCRIT  42.0  40.2*   PLATELETCT  250  236      Recent Labs      17   1037  17   0248   WBC  20.6*  13.5*   NEUTSPOLYS  86.00*  93.70*   LYMPHOCYTES  8.50*  4.80*   MONOCYTES  4.90  0.70   EOSINOPHILS  0.00  0.00   BASOPHILS  0.10  0.10           Hemodynamics:  Temp (24hrs), Av.7 °C (98.1 °F), Min:36.6 °C (97.9 °F), Max:36.8 °C (98.3 °F)  Temperature: 36.6 °C (97.9 °F)  Pulse  Av.8  Min: 76  Max: 124   Blood Pressure: 126/57 mmHg     Respiratory:    Respiration: 16, Pulse Oximetry: 90 %, O2 Daily Delivery Respiratory : Nasal Cannula     Given By:: Mouthpiece, Work Of Breathing / Effort: Moderate  RUL Breath Sounds: Expiratory Wheezes, RML Breath Sounds: Expiratory Wheezes, RLL Breath Sounds: Diminished;Expiratory Wheezes, DREW Breath Sounds: Expiratory Wheezes;Inspiratory Wheezes, LLL Breath Sounds: Diminished;Expiratory Wheezes;Inspiratory Wheezes  Fluids:    Intake/Output Summary (Last 24 hours) at 17 1700  Last data filed at 17 1200   Gross per 24 hour   Intake   2490 ml   Output      0 ml   Net   2490 ml        GI/Nutrition:  Orders Placed This Encounter   Procedures   • Diet Order     Standing Status: Standing      Number of Occurrences: 1      Standing Expiration Date:      Order Specific Question:  Diet:     Answer:  Regular [1]     Medical Decision Making, by Problem:  Active Hospital Problems    Diagnosis   • Bronchitis [J40]continue iv steroids, lung aeration improved but wheezes and prolonged expiratory phase with accessory muscle use present   Acute respiratory failure, continue oxygen and respiratory therapy  Asthma with acute exacerbation, continue steroids    Labs reviewed and Medications reviewed  Stanton catheter: No Stanton      DVT Prophylaxis: Not indicated at this time, ambulatory  DVT prophylaxis - mechanical: Not indicated at this time, ambulatory  Ulcer prophylaxis: Not indicated    Assessed for rehab: Patient returned to prior level of function, rehabilitation not indicated at this time

## 2017-02-03 NOTE — PROGRESS NOTES
Pneumonvax given right at discharge.  Pt verbalized understanding of going to his appt, new medications and why he is taking them as well as resuming his steroids, home care for asthma, quit smoking marijuana, and s/s of when to return back to ED.  Pt walked himself out with his girlfriend at his side at 1035.

## 2017-02-04 NOTE — DISCHARGE SUMMARY
DISCHARGE DIAGNOSES:  1.  Acute asthma exacerbation.  2.  Acute respiratory failure with hypoxia.  3.  Bronchospasm with wheezing.  4.  History of tobacco use.  5.  Marijuana use by smoking.    HOSPITAL COURSE:  The patient is a 26-year-old male who presented to the   hospital with worsening shortness of breath.  He stopped smoking cigarettes,   but now he has been smoking marijuana on a near daily basis.  He came to the   hospital with worsening shortness of breath, severe wheezing and chest   tightness, worse with any movement.  He was noted to be hypoxic with oxygen   saturations of 86% on room air.  He was admitted to the hospital and treated   with nebulizer solution, intravenous steroids and oxygen therapy   supplementally.  He had a prolonged expiratory phase with very poor air   movement and inspiratory and expiratory wheezes on examination.  Chest x-ray   was clear.  The patient was educated about discontinuing all smoking,   including marijuana smoking and the patient agreed that he would do so.    Patient did have improvement in his air movement as well as his wheezing and   exercise tolerance, his oxygenation did improve as well where he was no longer   requiring supplemental oxygen.  Patient was confirmed to have a nebulizer at   home that was working appropriately and was given prescriptions for inhaled   medication.  He was also given a note for work to be released a part time   given his difficulty with exercise tolerance on presentation.    DISPOSITION:  The patient was discharged home.    DISCHARGE MEDICATIONS:  Tessalon 100 mg 3 times daily as needed for cough,   Robitussin 10 mL every 4 hours as needed for cough, albuterol 2 puffs every 6   hours as needed for shortness of breath, prednisone 40 mg daily for 5 days,   Atrovent nebulizer solution every 6 hours as needed for shortness of breath,   albuterol nebulizer solution every 4 hours as needed for shortness of breath.    FOLLOWUP:  The patient  is to follow up with a primary care provider as soon as   possible.    Time of discharge was 45 minutes going over instructions, confirming his   nebulizer use as well as providing a note for work and making adjustments for   his exercise tolerance.       ____________________________________     MD JESSICA RAMIREZ / KVNG    DD:  02/03/2017 15:32:32  DT:  02/04/2017 11:49:19    D#:  [id number]  Job#:  [id number]

## 2017-03-14 ENCOUNTER — HOSPITAL ENCOUNTER (EMERGENCY)
Facility: MEDICAL CENTER | Age: 27
End: 2017-03-14
Attending: EMERGENCY MEDICINE
Payer: COMMERCIAL

## 2017-03-14 VITALS
RESPIRATION RATE: 24 BRPM | BODY MASS INDEX: 20.7 KG/M2 | SYSTOLIC BLOOD PRESSURE: 121 MMHG | TEMPERATURE: 97.7 F | WEIGHT: 139.77 LBS | OXYGEN SATURATION: 98 % | DIASTOLIC BLOOD PRESSURE: 77 MMHG | HEART RATE: 76 BPM | HEIGHT: 69 IN

## 2017-03-14 DIAGNOSIS — J45.901 ASTHMA WITH ACUTE EXACERBATION, UNSPECIFIED ASTHMA SEVERITY: ICD-10-CM

## 2017-03-14 PROCEDURE — 700111 HCHG RX REV CODE 636 W/ 250 OVERRIDE (IP): Performed by: EMERGENCY MEDICINE

## 2017-03-14 PROCEDURE — 700101 HCHG RX REV CODE 250: Performed by: EMERGENCY MEDICINE

## 2017-03-14 PROCEDURE — 99284 EMERGENCY DEPT VISIT MOD MDM: CPT

## 2017-03-14 PROCEDURE — 94640 AIRWAY INHALATION TREATMENT: CPT

## 2017-03-14 RX ORDER — PREDNISONE 20 MG/1
60 TABLET ORAL ONCE
Status: COMPLETED | OUTPATIENT
Start: 2017-03-14 | End: 2017-03-14

## 2017-03-14 RX ORDER — ALBUTEROL SULFATE 90 UG/1
2 AEROSOL, METERED RESPIRATORY (INHALATION) EVERY 6 HOURS PRN
Qty: 8.5 G | Refills: 3 | Status: SHIPPED | OUTPATIENT
Start: 2017-03-14 | End: 2017-09-05

## 2017-03-14 RX ORDER — PREDNISONE 20 MG/1
40 TABLET ORAL DAILY
Qty: 6 TAB | Refills: 0 | Status: SHIPPED | OUTPATIENT
Start: 2017-03-14 | End: 2017-03-17

## 2017-03-14 RX ADMIN — IPRATROPIUM BROMIDE 0.5 MG: 0.5 SOLUTION RESPIRATORY (INHALATION) at 02:06

## 2017-03-14 RX ADMIN — PREDNISONE 60 MG: 20 TABLET ORAL at 01:58

## 2017-03-14 RX ADMIN — ALBUTEROL SULFATE 2.5 MG: 2.5 SOLUTION RESPIRATORY (INHALATION) at 02:06

## 2017-03-14 ASSESSMENT — PAIN SCALES - GENERAL: PAINLEVEL_OUTOF10: 7

## 2017-03-14 NOTE — ED AVS SNAPSHOT
3/14/2017          Marcel Lozano  [address]  [address]  [place] [address]    Dear Marcel Juárez:    [facility] wants to ensure your discharge home is safe and you or your loved ones have had all your questions answered regarding your care after you leave the hospital.    You may receive a telephone call within two days of your discharge.  This call is to make certain you understand your discharge instructions as well as ensure we provided you with the best care possible during your stay with us.     The call will only last approximately 3-5 minutes and will be done by a nurse.    Once again, we want to ensure your discharge home is safe and that you have a clear understanding of any next steps in your care.  If you have any questions or concerns, please do not hesitate to contact us, we are here for you.  Thank you for choosing [facility] for your healthcare needs.    Sincerely,    Alfredo Petit    [facility]

## 2017-03-14 NOTE — ED AVS SNAPSHOT
Home Care Instructions                                                                                                                Marcel Lozano   MRN: [id number]    Department:  [facility], Emergency Dept   Date of Visit:  3/14/2017            [facility], Emergency Dept    [address] [place] [address]    Phone:  [phone]      You were seen by     Natalie Nath M.D.      Your Diagnosis Was     Asthma with acute exacerbation, unspecified asthma severity     J45.901       These are the medications you received during your hospitalization from 03/14/2017 0100 to 03/14/2017 0254     Date/Time Order Dose Route Action    03/14/2017 0206 albuterol (PROVENTIL) 2.5mg/0.5ml nebulizer solution 2.5 mg 2.5 mg Inhalation Given    03/14/2017 0206 ipratropium (ATROVENT) 0.02 % nebulizer solution 0.5 mg 0.5 mg Nebulization Given    03/14/2017 0158 predniSONE (DELTASONE) tablet 60 mg 60 mg Oral Given      Follow-up Information     1. Follow up with [facility]. Call today.    Why:  please call at 8:00 this morning to schedule a follow-up appointment    Contact information    [address] [place] [id number] [phone]        2. Go to [facility], Emergency Dept.    Specialty:  Emergency Medicine    Why:  If symptoms worsen or do not continue to improve    Contact information    [address]  [facility] [id number] [phone]      Medication Information     Review all of your home medications and newly ordered medications with your primary doctor and/or pharmacist as soon as possible. Follow medication instructions as directed by your doctor and/or pharmacist.     Please keep your complete medication list with you and share with your physician. Update the information when medications are discontinued, doses are changed, or new medications (including over-the-counter products) are added; and carry  medication information at all times in the event of emergency situations.               Medication List      START taking these medications        Instructions    Morning Afternoon Evening Bedtime    predniSONE 20 MG Tabs   Last time this was given:  60 mg on 3/14/2017  1:58 AM   Commonly known as:  DELTASONE        Take 2 Tabs by mouth every day for 3 days.   Dose:  40 mg                          CONTINUE taking these medications        Instructions    Morning Afternoon Evening Bedtime    * albuterol 2.5mg/0.5ml Nebu   Last time this was given:  2.5 mg on 3/14/2017  2:06 AM   Commonly known as:  PROVENTIL        Doctor's comments:  Bottle size per pharmacy or patient preference   0.5 mL by Nebulization route every four hours as needed for Shortness of Breath.   Dose:  2.5 mg                        * albuterol 108 (90 BASE) MCG/ACT Aers inhalation aerosol        Inhale 2 Puffs by mouth every 6 hours as needed for Shortness of Breath.   Dose:  2 Puff                        * Notice:  This list has 2 medication(s) that are the same as other medications prescribed for you. Read the directions carefully, and ask your doctor or other care provider to review them with you.      ASK your doctor about these medications        Instructions    Morning Afternoon Evening Bedtime    guaiFENesin 100 MG/5ML Soln   Commonly known as:  ROBITUSSIN        Take 10 mL by mouth every four hours as needed for Cough.   Dose:  10 mL                             Where to Get Your Medications      You can get these medications from any pharmacy     Bring a paper prescription for each of these medications    - albuterol 108 (90 BASE) MCG/ACT Aers inhalation aerosol  - predniSONE 20 MG Tabs            Procedures and tests performed during your visit     SMALL VOLUME NEBULIZER        Discharge Instructions       Please call the clinic listed below for a follow-up appointment. As we discussed, it is very important follow-up for further testing  because you may need more medications to control your asthma. Return to the emergency department if he developed worsening shortness of breath, cough, fevers, chest pain or any further concerns.      Asthma, Adult  Asthma is a recurring condition in which the airways tighten and narrow. Asthma can make it difficult to breathe. It can cause coughing, wheezing, and shortness of breath. Asthma episodes, also called asthma attacks, range from minor to life-threatening. Asthma cannot be cured, but medicines and lifestyle changes can help control it.  CAUSES  Asthma is believed to be caused by inherited (genetic) and environmental factors, but its exact cause is unknown. Asthma may be triggered by allergens, lung infections, or irritants in the air. Asthma triggers are different for each person. Common triggers include:   · Animal dander.  · Dust mites.  · Cockroaches.  · Pollen from trees or grass.  · Mold.  · Smoke.  · Air pollutants such as dust, household , hair sprays, aerosol sprays, paint fumes, strong chemicals, or strong odors.  · Cold air, weather changes, and winds (which increase molds and pollens in the air).  · Strong emotional expressions such as crying or laughing hard.  · Stress.  · Certain medicines (such as aspirin) or types of drugs (such as beta-blockers).  · Sulfites in foods and drinks. Foods and drinks that may contain sulfites include dried fruit, potato chips, and sparkling grape juice.  · Infections or inflammatory conditions such as the flu, a cold, or an inflammation of the nasal membranes (rhinitis).  · Gastroesophageal reflux disease (GERD).  · Exercise or strenuous activity.  SYMPTOMS  Symptoms may occur immediately after asthma is triggered or many hours later. Symptoms include:  · Wheezing.  · Excessive nighttime or early morning coughing.  · Frequent or severe coughing with a common cold.  · Chest tightness.  · Shortness of breath.  DIAGNOSIS   The diagnosis of asthma is made by  a review of your medical history and a physical exam. Tests may also be performed. These may include:  · Lung function studies. These tests show how much air you breathe in and out.  · Allergy tests.  · Imaging tests such as X-rays.  TREATMENT   Asthma cannot be cured, but it can usually be controlled. Treatment involves identifying and avoiding your asthma triggers. It also involves medicines. There are 2 classes of medicine used for asthma treatment:   · Controller medicines. These prevent asthma symptoms from occurring. They are usually taken every day.  · Reliever or rescue medicines. These quickly relieve asthma symptoms. They are used as needed and provide short-term relief.  Your health care provider will help you create an asthma action plan. An asthma action plan is a written plan for managing and treating your asthma attacks. It includes a list of your asthma triggers and how they may be avoided. It also includes information on when medicines should be taken and when their dosage should be changed. An action plan may also involve the use of a device called a peak flow meter. A peak flow meter measures how well the lungs are working. It helps you monitor your condition.  HOME CARE INSTRUCTIONS   · Take medicines only as directed by your health care provider. Speak with your health care provider if you have questions about how or when to take the medicines.  · Use a peak flow meter as directed by your health care provider. Record and keep track of readings.  · Understand and use the action plan to help minimize or stop an asthma attack without needing to seek medical care.  · Control your home environment in the following ways to help prevent asthma attacks:  ¨ Do not smoke. Avoid being exposed to secondhand smoke.  ¨ Change your heating and air conditioning filter regularly.  ¨ Limit your use of fireplaces and wood stoves.  ¨ Get rid of pests (such as roaches and mice) and their droppings.  ¨ Throw away  plants if you see mold on them.  ¨ Clean your floors and dust regularly. Use unscented cleaning products.  ¨ Try to have someone else vacuum for you regularly. Stay out of rooms while they are being vacuumed and for a short while afterward. If you vacuum, use a dust mask from a hardware store, a double-layered or microfilter vacuum  bag, or a vacuum  with a HEPA filter.  ¨ Replace carpet with wood, tile, or vinyl michelle. Carpet can trap dander and dust.  ¨ Use allergy-proof pillows, mattress covers, and box spring covers.  ¨ Wash bed sheets and blankets every week in hot water and dry them in a dryer.  ¨ Use blankets that are made of polyester or cotton.  ¨ Clean bathrooms and terri with bleach. If possible, have someone repaint the walls in these rooms with mold-resistant paint. Keep out of the rooms that are being cleaned and painted.  ¨ Wash hands frequently.  SEEK MEDICAL CARE IF:   · You have wheezing, shortness of breath, or a cough even if taking medicine to prevent attacks.  · The colored mucus you cough up (sputum) is thicker than usual.  · Your sputum changes from clear or white to yellow, green, gray, or bloody.  · You have any problems that may be related to the medicines you are taking (such as a rash, itching, swelling, or trouble breathing).  · You are using a reliever medicine more than 2-3 times per week.  · Your peak flow is still at 50-79% of your personal best after following your action plan for 1 hour.  · You have a fever.  SEEK IMMEDIATE MEDICAL CARE IF:   · You seem to be getting worse and are unresponsive to treatment during an asthma attack.  · You are short of breath even at rest.  · You get short of breath when doing very little physical activity.  · You have difficulty eating, drinking, or talking due to asthma symptoms.  · You develop chest pain.  · You develop a fast heartbeat.  · You have a bluish color to your lips or fingernails.  · You are light-headed, dizzy, or  faint.  · Your peak flow is less than 50% of your personal best.  MAKE SURE YOU:   · Understand these instructions.  · Will watch your condition.  · Will get help right away if you are not doing well or get worse.     This information is not intended to replace advice given to you by your health care provider. Make sure you discuss any questions you have with your health care provider.     Document Released: 12/18/2006 Document Revised: 01/08/2016 Document Reviewed: 07/17/2014  Elsevier Interactive Patient Education ©2016 [employer]            Patient Information     Patient Information    Following emergency treatment: all patient requiring follow-up care must return either to a private physician or a clinic if your condition worsens before you are able to obtain further medical attention, please return to the emergency room.     Billing Information    At [facility], we work to make the billing process streamlined for our patients.  Our Representatives are here to answer any questions you may have regarding your hospital bill.  If you have insurance coverage and have supplied your insurance information to us, we will submit a claim to your insurer on your behalf.  Should you have any questions regarding your bill, we can be reached online or by phone as follows:  Online: You are able pay your bills online or live chat with our representatives about any billing questions you may have. We are here to help Monday - Friday from 8:00am to 7:30pm and 9:00am - 12:00pm on Saturdays.  Please visit [email or web address]  for more information.   Phone:  [phone] or [phone]    Please note that your emergency physician, surgeon, pathologist, radiologist, anesthesiologist, and other specialists are not employed by [facility] and will therefore bill separately for their services.  Please contact them directly for any questions concerning their bills at the numbers below:     Emergency  Physician Services:  [phone]  [facility]:  [phone]  [facility]:  [phone]  [facility]:  [phone]    1. Your final bill may vary from the amount quoted upon discharge if all procedures are not complete at that time, or if your doctor has additional procedures of which we are not aware. You will receive an additional bill if you return to the Emergency Department at [facility] for suture removal regardless of the facility of which the sutures were placed.     2. Please arrange for settlement of this account at the emergency registration.    3. All self-pay accounts are due in full at the time of treatment.  If you are unable to meet this obligation then payment is expected within 4-5 days.     4. If you have had radiology studies (CT, X-ray, Ultrasound, MRI), you have received a preliminary result during your emergency department visit. Please contact the radiology department [phone] to receive a copy of your final result. Please discuss the Final result with your primary physician or with the follow up physician provided.     Crisis Hotline:  [place] Crisis Hotline:  9-863-GYIBGEN or 1-774.654.4452  [place] Crisis Hotline:    1-857.342.2684 or 942-476-0315         ED Discharge Follow Up Questions    1. In order to provide you with very good care, we would like to follow up with a phone call in the next few days.  May we have your permission to contact you?     YES /  NO    2. What is the best phone number to call you? (       )_____-__________    3. What is the best time to call you?      Morning  /  Afternoon  /  Evening                   Patient Signature:  ____________________________________________________________    Date:  ____________________________________________________________

## 2017-03-14 NOTE — ED AVS SNAPSHOT
FreeBrie Access Code: [email or web address]  Expires: 3/20/2017 11:02 AM    Your email address is not on file at [facility].  Email Addresses are required for you to sign up for FreeBrie, please contact [phone] to verify your personal information and to provide your email address prior to attempting to register for FreeBrie.    Marcel Arauz[place]ith [place] [id number] [facility]  [address] [place] [id number]    FreeBrie  A secure, online tool to manage your health information     [facility]’s FreeBrie® is a secure, online tool that connects you to your personalized health information from the privacy of your home -- day or night - making it very easy for you to manage your healthcare. Once the activation process is completed, you can even access your medical information using the FreeBrie lili, which is available for free in the Apple Lili store or Google Play store.     To learn more about FreeBrie, visit [email or web address]    There are two levels of access available (as shown below):   My Chart Features  [facility] Primary Care Doctor [facility]  Specialists [facility]  Urgent  Care [facility] Primary Care Doctor   Email your healthcare team securely and privately 24/7 X X X    Manage appointments: schedule your next appointment; view details of past/upcoming appointments X      Request prescription refills. X      View recent personal medical records, including lab and immunizations X X X X   View health record, including health history, allergies, medications X X X X   Read reports about your outpatient visits, procedures, consult and ER notes X X X X   See your discharge summary, which is a recap of your hospital and/or ER visit that includes your diagnosis, lab results, and care plan X X  X     How to register for Expreemt:  Once your e-mail address has been verified, follow the following steps to sign up for Expreemt.     1. Go to  [email or web address]  2. Click on the Sign Up Now box, which takes you to the New Member Sign Up  page. You will need to provide the following information:  a. Enter your Lumiata Access Code exactly as it appears at the top of this page. (You will not need to use this code after you’ve completed the sign-up process. If you do not sign up before the expiration date, you must request a new code.)   b. Enter your date of birth.   c. Enter your home email address.   d. Click Submit, and follow the next screen’s instructions.  3. Create a Lumiata ID. This will be your Lumiata login ID and cannot be changed, so think of one that is secure and easy to remember.  4. Create a Lumiata password. You can change your password at any time.  5. Enter your Password Reset Question and Answer. This can be used at a later time if you forget your password.   6. Enter your e-mail address. This allows you to receive e-mail notifications when new information is available in Lumiata.  7. Click Sign Up. You can now view your health information.    For assistance activating your Lumiata account, call [phone]

## 2017-03-14 NOTE — ED PROVIDER NOTES
ED Provider Note    Chief Complaint:   Shortness of breath    HPI:  Marcel Lozano is a 26 y.o. male who presents with complaint of asthma exacerbation. Onset around 7:30 this evening when he developed progressively worsening shortness of breath. He attempted to use his home nebulizer but states it is not working. Additionally he ran out of his albuterol rescue inhaler. Is not currently taking any inhaled or oral steroids, states his last dose of steroids for the month and half ago. He does not currently have a primary care physician, states it is difficult for him to get established because of his job. Denies any fevers, no chest pain aside from discomfort with breathing, no leg pain, no leg swelling.    Review of Systems:  See HPI for pertinent positives and negatives.    Past Medical History:   has a past medical history of Asthma.    Social History:  Social History     Social History Main Topics   • Smoking status: Former Smoker -- 0.25 packs/day     Types: Cigarettes     Quit date: 01/18/2017   • Smokeless tobacco: Never Used   • Alcohol Use: Yes      Comment: Socially   • Drug Use: No      Comment: marijuana-weekends, eating-denies 03/14/17   • Sexual Activity: Not on file       Surgical History:  patient denies any surgical history    Current Medications:  Home Medications     Reviewed by Nedra Winn R.N. (Registered Nurse) on 03/14/17 at 0200  Med List Status: Complete    Medication Last Dose Status    albuterol (PROVENTIL) 2.5mg/0.5ml Nebu Soln 3/14/2017 Active    albuterol 108 (90 BASE) MCG/ACT Aero Soln inhalation aerosol 3/14/2017 Active    albuterol 108 (90 BASE) MCG/ACT Aero Soln inhalation aerosol  Active    guaiFENesin (ROBITUSSIN) 100 MG/5ML Solution  Active                Allergies:  Allergies   Allergen Reactions   • Cat Hair Extract Runny Nose     Itchy nose, sob   • Mushroom Extract Complex Hives       Physical Exam:  Vital Signs: /77 mmHg  Pulse 76  Temp(Src) 36.5 °C (97.7  "°F)  Resp 24  Ht 1.753 m (5' 9\")  Wt 63.4 kg (139 lb 12.4 oz)  BMI 20.63 kg/m2  SpO2 98%  Constitutional: Alert, no acute distress  HENT: Normocephalic, atraumatic, moist mucus membranes  Neck: no stridor  Cardiovascular: Normal peripheral perfusion, no murmer, no cyanosis, normal cardiac auscultation  Pulmonary: Oxygen saturation 96%, very mild tachypnea, able to speak full sentences, no accessory muscle usage, diffuse moderate wheezing throughout all lung fields, no crackles  Neurologic: Alert, oriented, normal motor function, no speech deficits  Psychiatric: Normal and appropriate mood and affect    MDM:  Patient presents with asthma exacerbation. History and physical exam are consistent with reactive airway disease. Patient has no PE risk factors. Treated with DuoNeb in the emergency department as well as a single dose of prednisone. On reassessment work of breathing is significantly improved, pulmonary auscultation is also improved with much better air movement and significantly decreased wheezing. At this time he is only using her rescue inhaler. I did refill his albuterol inhaler and put him on a short course of steroids. I counseled him on the importance of following up with his primary care physician for complete evaluation of his asthma to determine if he should be on any inhaled steroids or long-acting beta agonist. He will contact [facility] tomorrow for a follow-up appointment.\    Return precautions given including worsening shortness of breath, fevers, or any new or worsening symptoms.    Blood pressure today is greater than 120/80, pt instructed to follow up with primary care for recheck    Disposition:  Discharge home in stable condition    Final Impression:  Asthma exacerbation    Electronically signed by: Natalie Nath, 3/14/2017 1:55 AM      "

## 2017-03-14 NOTE — ED NOTES
"Chief Complaint   Patient presents with   • Shortness of Breath     pt c/o difficulty breathing since 1900 this pm   • Asthma       /77 mmHg  Pulse 76  Temp(Src) 36.5 °C (97.7 °F)  Resp 24  Ht 1.753 m (5' 9\")  Wt 63.4 kg (139 lb 12.4 oz)  BMI 20.63 kg/m2  SpO2 96%    "

## 2017-03-14 NOTE — DISCHARGE INSTRUCTIONS
Please call the clinic listed below for a follow-up appointment. As we discussed, it is very important follow-up for further testing because you may need more medications to control your asthma. Return to the emergency department if he developed worsening shortness of breath, cough, fevers, chest pain or any further concerns.      Asthma, Adult  Asthma is a recurring condition in which the airways tighten and narrow. Asthma can make it difficult to breathe. It can cause coughing, wheezing, and shortness of breath. Asthma episodes, also called asthma attacks, range from minor to life-threatening. Asthma cannot be cured, but medicines and lifestyle changes can help control it.  CAUSES  Asthma is believed to be caused by inherited (genetic) and environmental factors, but its exact cause is unknown. Asthma may be triggered by allergens, lung infections, or irritants in the air. Asthma triggers are different for each person. Common triggers include:   · Animal dander.  · Dust mites.  · Cockroaches.  · Pollen from trees or grass.  · Mold.  · Smoke.  · Air pollutants such as dust, household , hair sprays, aerosol sprays, paint fumes, strong chemicals, or strong odors.  · Cold air, weather changes, and winds (which increase molds and pollens in the air).  · Strong emotional expressions such as crying or laughing hard.  · Stress.  · Certain medicines (such as aspirin) or types of drugs (such as beta-blockers).  · Sulfites in foods and drinks. Foods and drinks that may contain sulfites include dried fruit, potato chips, and sparkling grape juice.  · Infections or inflammatory conditions such as the flu, a cold, or an inflammation of the nasal membranes (rhinitis).  · Gastroesophageal reflux disease (GERD).  · Exercise or strenuous activity.  SYMPTOMS  Symptoms may occur immediately after asthma is triggered or many hours later. Symptoms include:  · Wheezing.  · Excessive nighttime or early morning  coughing.  · Frequent or severe coughing with a common cold.  · Chest tightness.  · Shortness of breath.  DIAGNOSIS   The diagnosis of asthma is made by a review of your medical history and a physical exam. Tests may also be performed. These may include:  · Lung function studies. These tests show how much air you breathe in and out.  · Allergy tests.  · Imaging tests such as X-rays.  TREATMENT   Asthma cannot be cured, but it can usually be controlled. Treatment involves identifying and avoiding your asthma triggers. It also involves medicines. There are 2 classes of medicine used for asthma treatment:   · Controller medicines. These prevent asthma symptoms from occurring. They are usually taken every day.  · Reliever or rescue medicines. These quickly relieve asthma symptoms. They are used as needed and provide short-term relief.  Your health care provider will help you create an asthma action plan. An asthma action plan is a written plan for managing and treating your asthma attacks. It includes a list of your asthma triggers and how they may be avoided. It also includes information on when medicines should be taken and when their dosage should be changed. An action plan may also involve the use of a device called a peak flow meter. A peak flow meter measures how well the lungs are working. It helps you monitor your condition.  HOME CARE INSTRUCTIONS   · Take medicines only as directed by your health care provider. Speak with your health care provider if you have questions about how or when to take the medicines.  · Use a peak flow meter as directed by your health care provider. Record and keep track of readings.  · Understand and use the action plan to help minimize or stop an asthma attack without needing to seek medical care.  · Control your home environment in the following ways to help prevent asthma attacks:  ¨ Do not smoke. Avoid being exposed to secondhand smoke.  ¨ Change your heating and air conditioning  filter regularly.  ¨ Limit your use of fireplaces and wood stoves.  ¨ Get rid of pests (such as roaches and mice) and their droppings.  ¨ Throw away plants if you see mold on them.  ¨ Clean your floors and dust regularly. Use unscented cleaning products.  ¨ Try to have someone else vacuum for you regularly. Stay out of rooms while they are being vacuumed and for a short while afterward. If you vacuum, use a dust mask from a hardware store, a double-layered or microfilter vacuum  bag, or a vacuum  with a HEPA filter.  ¨ Replace carpet with wood, tile, or vinyl michelle. Carpet can trap dander and dust.  ¨ Use allergy-proof pillows, mattress covers, and box spring covers.  ¨ Wash bed sheets and blankets every week in hot water and dry them in a dryer.  ¨ Use blankets that are made of polyester or cotton.  ¨ Clean bathrooms and terri with bleach. If possible, have someone repaint the walls in these rooms with mold-resistant paint. Keep out of the rooms that are being cleaned and painted.  ¨ Wash hands frequently.  SEEK MEDICAL CARE IF:   · You have wheezing, shortness of breath, or a cough even if taking medicine to prevent attacks.  · The colored mucus you cough up (sputum) is thicker than usual.  · Your sputum changes from clear or white to yellow, green, gray, or bloody.  · You have any problems that may be related to the medicines you are taking (such as a rash, itching, swelling, or trouble breathing).  · You are using a reliever medicine more than 2-3 times per week.  · Your peak flow is still at 50-79% of your personal best after following your action plan for 1 hour.  · You have a fever.  SEEK IMMEDIATE MEDICAL CARE IF:   · You seem to be getting worse and are unresponsive to treatment during an asthma attack.  · You are short of breath even at rest.  · You get short of breath when doing very little physical activity.  · You have difficulty eating, drinking, or talking due to asthma  symptoms.  · You develop chest pain.  · You develop a fast heartbeat.  · You have a bluish color to your lips or fingernails.  · You are light-headed, dizzy, or faint.  · Your peak flow is less than 50% of your personal best.  MAKE SURE YOU:   · Understand these instructions.  · Will watch your condition.  · Will get help right away if you are not doing well or get worse.     This information is not intended to replace advice given to you by your health care provider. Make sure you discuss any questions you have with your health care provider.     Document Released: 12/18/2006 Document Revised: 01/08/2016 Document Reviewed: 07/17/2014  Elsevier Interactive Patient Education ©2016 [employer] Inc[employer]

## 2017-03-15 ENCOUNTER — PATIENT OUTREACH (OUTPATIENT)
Dept: HEALTH INFORMATION MANAGEMENT | Facility: OTHER | Age: 27
End: 2017-03-15

## 2017-03-15 NOTE — PROGRESS NOTES
03/15/2017 1458 - Discharge Outreach attempt - LM  03/16/2017 1641 - Discharge Outreach attempt - LM

## 2017-09-05 ENCOUNTER — HOSPITAL ENCOUNTER (EMERGENCY)
Facility: MEDICAL CENTER | Age: 27
End: 2017-09-05
Attending: EMERGENCY MEDICINE
Payer: COMMERCIAL

## 2017-09-05 VITALS
WEIGHT: 134.26 LBS | HEART RATE: 106 BPM | RESPIRATION RATE: 22 BRPM | HEIGHT: 69 IN | DIASTOLIC BLOOD PRESSURE: 73 MMHG | TEMPERATURE: 98.6 F | SYSTOLIC BLOOD PRESSURE: 113 MMHG | BODY MASS INDEX: 19.89 KG/M2 | OXYGEN SATURATION: 93 %

## 2017-09-05 DIAGNOSIS — Z76.0 MEDICATION REFILL: ICD-10-CM

## 2017-09-05 DIAGNOSIS — J45.901 ASTHMA WITH ACUTE EXACERBATION, UNSPECIFIED ASTHMA SEVERITY: ICD-10-CM

## 2017-09-05 PROCEDURE — 94640 AIRWAY INHALATION TREATMENT: CPT

## 2017-09-05 PROCEDURE — 700111 HCHG RX REV CODE 636 W/ 250 OVERRIDE (IP): Performed by: EMERGENCY MEDICINE

## 2017-09-05 PROCEDURE — 94760 N-INVAS EAR/PLS OXIMETRY 1: CPT

## 2017-09-05 PROCEDURE — 700101 HCHG RX REV CODE 250: Performed by: EMERGENCY MEDICINE

## 2017-09-05 PROCEDURE — 99283 EMERGENCY DEPT VISIT LOW MDM: CPT

## 2017-09-05 RX ORDER — ALBUTEROL SULFATE 90 UG/1
2 AEROSOL, METERED RESPIRATORY (INHALATION) EVERY 4 HOURS PRN
Qty: 1 INHALER | Refills: 0 | Status: SHIPPED | OUTPATIENT
Start: 2017-09-05 | End: 2017-12-16

## 2017-09-05 RX ORDER — PREDNISONE 20 MG/1
60 TABLET ORAL ONCE
Status: COMPLETED | OUTPATIENT
Start: 2017-09-05 | End: 2017-09-05

## 2017-09-05 RX ORDER — PREDNISONE 20 MG/1
40 TABLET ORAL DAILY
Qty: 8 TAB | Refills: 0 | Status: SHIPPED | OUTPATIENT
Start: 2017-09-05 | End: 2017-09-09

## 2017-09-05 RX ORDER — ALBUTEROL SULFATE 90 UG/1
2 AEROSOL, METERED RESPIRATORY (INHALATION) EVERY 6 HOURS PRN
Status: SHIPPED | COMMUNITY
End: 2017-12-16

## 2017-09-05 RX ORDER — ALBUTEROL SULFATE 2.5 MG/3ML
2.5 SOLUTION RESPIRATORY (INHALATION) EVERY 4 HOURS PRN
Qty: 30 BULLET | Refills: 0 | Status: SHIPPED | OUTPATIENT
Start: 2017-09-05 | End: 2017-12-16

## 2017-09-05 RX ORDER — IPRATROPIUM BROMIDE AND ALBUTEROL SULFATE 2.5; .5 MG/3ML; MG/3ML
3 SOLUTION RESPIRATORY (INHALATION) 4 TIMES DAILY
Status: SHIPPED | COMMUNITY
End: 2018-07-27

## 2017-09-05 RX ADMIN — ALBUTEROL SULFATE 2.5 MG: 2.5 SOLUTION RESPIRATORY (INHALATION) at 06:59

## 2017-09-05 RX ADMIN — IPRATROPIUM BROMIDE 0.5 MG: 0.5 SOLUTION RESPIRATORY (INHALATION) at 06:59

## 2017-09-05 RX ADMIN — PREDNISONE 60 MG: 20 TABLET ORAL at 06:57

## 2017-09-05 ASSESSMENT — ENCOUNTER SYMPTOMS
ABDOMINAL PAIN: 0
FEVER: 0
VOMITING: 0
NAUSEA: 0
WHEEZING: 1
SHORTNESS OF BREATH: 1

## 2017-09-05 ASSESSMENT — PAIN SCALES - GENERAL: PAINLEVEL_OUTOF10: 0

## 2017-09-05 NOTE — FLOWSHEET NOTE
09/05/17 0659   Events/Summary/Plan   Events/Summary/Plan ER TX   Interdisciplinary Plan of Care-Goals (Indications)   Obstructive Ventilatory Defect or Pulmonary Disease without Obvious Obstruction Physical Exam / Hyperinflation / Wheezing (bronchospasm)   Interdisciplinary Plan of Care-Outcomes    Bronchodilator Outcome Diminished Wheezing and Volume of Air Movement Increased   Education   Education Yes - Pt. / Family has been Instructed in use of Respiratory Medications and Adverse Reactions;Yes - Pt. / Family has been Instructed in use of Respiratory Equipment   RT Assessment of Delivered Medications   Evaluation of Medication Delivery Daily Yes-- Pt /Family has been Instructed in use of Respiratory Medications and Adverse Reactions   SVN Group   #SVN Performed Yes   Given By: Mouthpiece   Date SVN Last Changed 09/05/17   Date SVN Next Change Due (Q 7 Days) 09/12/17   Respiratory WDL   Respiratory (WDL) X   Chest Exam   Respiration (!) 22   Heart Rate (Monitored) 74   Breath Sounds   Pre/Post Intervention Pre Intervention Assessment   RUL Breath Sounds Expiratory Wheezes   RML Breath Sounds Expiratory Wheezes   RLL Breath Sounds Expiratory Wheezes   DREW Breath Sounds Expiratory Wheezes   LLL Breath Sounds Expiratory Wheezes   Oximetry   #Pulse Oximetry (Single Determination) Yes   Oxygen   Home O2 Use Prior To Admission? No   Pulse Oximetry 95 %   O2 Daily Delivery Respiratory  Room Air with O2 Available   A slight increase in aeration after TX.

## 2017-09-05 NOTE — ED PROVIDER NOTES
"ED Provider Note    ED Provider Note    Scribed for No att. providers found by Camilla Curry. 9/5/2017, 6:49 AM.    Primary care provider: Pcp Pt States None  Means of arrival: POV  History obtained from: Patient  History limited by: None    CHIEF COMPLAINT  Chief Complaint   Patient presents with   • Shortness of Breath     Started at 0430 this am. Pt has hx asthma, states \"I am out of my medicaitons.\" Denies any other symptoms, CP, or recent illness       HPI  Marcel Lozano is a 27 y.o. male who presents to the Emergency DepartmentWith a chief complaint of an asthma exacerbation. Patient states that he ran out of his medication. He called the pharmacy this morning but states that he had no further refills. He states he is having a \"mild\" exacerbation. And that usually, he is worse when he comes to the ED. Denies a fever or any GI symptoms-no vomiting, diarrhea or nausea. He does not have a primary care doctor. He states that he's been trying to get one over the last several months but works too many hours. And indeed, when questioned, states that over the course of the last 9 months, he has not been able to find any time to arrange follow-up with a primary care provider.    REVIEW OF SYSTEMS  Review of Systems   Constitutional: Negative for fever.   Respiratory: Positive for shortness of breath and wheezing.    Cardiovascular: Negative for chest pain.   Gastrointestinal: Negative for abdominal pain, nausea and vomiting.       PAST MEDICAL HISTORY   has a past medical history of Asthma.    SURGICAL HISTORY  patient denies any surgical history    SOCIAL HISTORY  Social History   Substance Use Topics   • Smoking status: Former Smoker     Packs/day: 0.25     Types: Cigarettes     Quit date: 1/18/2017   • Smokeless tobacco: Never Used   • Alcohol use Yes      Comment: Socially      History   Drug Use No     Comment: marijuana-weekends, eating-denies 03/14/17       FAMILY HISTORY  History reviewed. No pertinent " "family history.    CURRENT MEDICATIONS  Home Medications     Reviewed by Tessie Craig R.N. (Registered Nurse) on 09/05/17 at 0646  Med List Status: Complete   Medication Last Dose Status   albuterol (PROVENTIL) 2.5mg/0.5ml Nebu Soln 3/14/2017 Active   albuterol 108 (90 BASE) MCG/ACT Aero Soln inhalation aerosol  Active                ALLERGIES  Allergies   Allergen Reactions   • Cat Hair Extract Runny Nose     Itchy nose, sob   • Mushroom Extract Complex Hives       PHYSICAL EXAM  VITAL SIGNS: /73   Pulse 91   Temp 37 °C (98.6 °F)   Resp 18   Ht 1.753 m (5' 9\")   Wt 60.9 kg (134 lb 4.2 oz)   SpO2 94%   BMI 19.83 kg/m²   Vitals reviewed.  Constitutional: Patient is oriented to person, place, and time. Appears well-developed and well-nourished. No distress.    Head: Normocephalic and atraumatic.   Ears: Normal external ears bilaterally.   Eyes: Conjunctivae are normal.   Neck: Normal range of motion. Neck supple.  Cardiovascular: Normal rate, regular rhythm and normal heart sounds.   Pulmonary/Chest: Effort normal and breath sounds normal. No respiratory distress. MIld diffuse wheezes. No rhonchi, or rales.   Abdominal: Soft. Bowel sounds are normal.   Musculoskeletal: No tenderness.   Skin: Skin is warm and dry.   Psychiatric: Patient has a normal mood and affect.     COURSE & MEDICAL DECISION MAKING  Pertinent Labs & Imaging studies reviewed. (See chart for details)    Obtained and reviewed past medical records. This is the patient's 4th visit in 2017 for similar complaints.    6:49 AM - Patient seen and examined at bedside. This is an overall well-appearing 27-year-old male presents with mild diffuse wheezes. He does not have any increased work of breathing. He apparently, is too busy at work to arrange primary care so he uses the emergency room and states that typically receives multiple refills from here. Patient will be given a breathing treatment. He demonstrates no increased work of " "breathing or respiratory distress. He'll be started on a short course of steroids. I've encouraged him to establish care with a primary care provider.     7:48 AM patient reevaluated after nebulizer treatment. He states he feels \"10 times better\". Again wheezes resolved, no increased work of breathing. At this time, he safely discharged to home. I have again, encouraged him to establish care with a primary care provider. He'll be discharged to home in stable condition.        FINAL IMPRESSION  1. Asthma with acute exacerbation, unspecified asthma severity    2. Medication refill                 "

## 2017-09-05 NOTE — DISCHARGE INSTRUCTIONS
You need to establish care with a primary care provider.    Medicine Refill at the Emergency Department  We have refilled your medicine today, but it is best for you to get refills through your primary health care provider's office. In the future, please plan ahead so you do not need to get refills from the emergency department.  If the medicine we refilled was a maintenance medicine, you may have received only enough to get you by until you are able to see your regular health care provider.     This information is not intended to replace advice given to you by your health care provider. Make sure you discuss any questions you have with your health care provider.     Document Released: 04/05/2005 Document Revised: 01/08/2016 Document Reviewed: 03/27/2015  [employer] Interactive Patient Education ©2016 [employer]        Asthma Prevention  Cigarette smoke, house dust, molds, pollens, animal dander, certain insects, exercise, and even cold air are all triggers that can cause an asthma attack. Often, no specific triggers are identified.   Take the following measures around your house to reduce attacks:  · Avoid cigarette and other smoke. No smoking should be allowed in a home where someone with asthma lives. If smoking is allowed indoors, it should be done in a room with a closed door, and a window should be opened to clear the air. If possible, do not use a wood-burning stove, kerosene heater, or fireplace. Minimize exposure to all sources of smoke, including incense, candles, fires, and fireworks.  · Decrease pollen exposure. Keep your windows shut and use central air during the pollen allergy season. Stay indoors with windows closed from late morning to afternoon, if you can. Avoid mowing the lawn if you have grass pollen allergy. Change your clothes and shower after being outside during this time of year.  · Remove molds from bathrooms and wet areas. Do this by cleaning the floors with a fungicide or diluted  bleach. Avoid using humidifiers, vaporizers, or swamp coolers. These can spread molds through the air. Fix leaky faucets, pipes, or other sources of water that have mold around them.  · Decrease house dust exposure. Do this by using bare floors, vacuuming frequently, and changing furnace and air cooler filters frequently. Avoid using feather, wool, or foam bedding. Use polyester pillows and plastic covers over your mattress. Wash bedding weekly in hot water (hotter than 130° F).  · Try to get someone else to vacuum for you once or twice a week, if you can. Stay out of rooms while they are being vacuumed and for a short while afterward. If you vacuum, use a dust mask (from a hardware store), a double-layered or microfilter vacuum  bag, or a vacuum  with a HEPA filter.  · Avoid perfumes, talcum powder, hair spray, paints and other strong odors and fumes.  · Keep warm-blooded pets (cats, dogs, rodents, birds) outside the home if they are triggers for asthma. If you can't keep the pet outdoors, keep the pet out of your bedroom and other sleeping areas at all times, and keep the door closed. Remove carpets and furniture covered with cloth from your home. If that is not possible, keep the pet away from fabric-covered furniture and carpets.  · Eliminate cockroaches. Keep food and garbage in closed containers. Never leave food out. Use poison baits, traps, powders, gels, or paste (for example, boric acid). If a spray is used to kill cockroaches, stay out of the room until the odor goes away.  · Decrease indoor humidity to less than 60%. Use an indoor air cleaning device.  · Avoid sulfites in foods and beverages. Do not drink beer or wine or eat dried fruit, processed potatoes, or shrimp if they cause asthma symptoms.  · Avoid cold air. Cover your nose and mouth with a scarf on cold or windy days.  · Avoid aspirin. This is the most common drug causing serious asthma attacks.  · If exercise triggers your asthma,  ask your caregiver how you should prepare before exercising. (For example, ask if you could use your inhaler 10 minutes before exercising.)  · Avoid close contact with people who have a cold or the flu since your asthma symptoms may get worse if you catch the infection from them. Wash your hands thoroughly after touching items that may have been handled by others with a respiratory infection.  · Get a flu shot every year to protect against the flu virus, which often makes asthma worse for days to weeks. Also get a pneumonia shot once every five to 10 years.  Call your caregiver if you want further information about measures you can take to help prevent asthma attacks.  Document Released: 12/18/2006 Document Revised: 03/11/2013 Document Reviewed: 10/26/2010  [employer]® Patient Information ©2014 [employer].        Asthma, Adult  Asthma is a recurring condition in which the airways tighten and narrow. Asthma can make it difficult to breathe. It can cause coughing, wheezing, and shortness of breath. Asthma episodes, also called asthma attacks, range from minor to life-threatening. Asthma cannot be cured, but medicines and lifestyle changes can help control it.  CAUSES  Asthma is believed to be caused by inherited (genetic) and environmental factors, but its exact cause is unknown. Asthma may be triggered by allergens, lung infections, or irritants in the air. Asthma triggers are different for each person. Common triggers include:   · Animal dander.  · Dust mites.  · Cockroaches.  · Pollen from trees or grass.  · Mold.  · Smoke.  · Air pollutants such as dust, household , hair sprays, aerosol sprays, paint fumes, strong chemicals, or strong odors.  · Cold air, weather changes, and winds (which increase molds and pollens in the air).  · Strong emotional expressions such as crying or laughing hard.  · Stress.  · Certain medicines (such as aspirin) or types of drugs (such as beta-blockers).  · Sulfites in foods  and drinks. Foods and drinks that may contain sulfites include dried fruit, potato chips, and sparkling grape juice.  · Infections or inflammatory conditions such as the flu, a cold, or an inflammation of the nasal membranes (rhinitis).  · Gastroesophageal reflux disease (GERD).  · Exercise or strenuous activity.  SYMPTOMS  Symptoms may occur immediately after asthma is triggered or many hours later. Symptoms include:  · Wheezing.  · Excessive nighttime or early morning coughing.  · Frequent or severe coughing with a common cold.  · Chest tightness.  · Shortness of breath.  DIAGNOSIS   The diagnosis of asthma is made by a review of your medical history and a physical exam. Tests may also be performed. These may include:  · Lung function studies. These tests show how much air you breathe in and out.  · Allergy tests.  · Imaging tests such as X-rays.  TREATMENT   Asthma cannot be cured, but it can usually be controlled. Treatment involves identifying and avoiding your asthma triggers. It also involves medicines. There are 2 classes of medicine used for asthma treatment:   · Controller medicines. These prevent asthma symptoms from occurring. They are usually taken every day.  · Reliever or rescue medicines. These quickly relieve asthma symptoms. They are used as needed and provide short-term relief.  Your health care provider will help you create an asthma action plan. An asthma action plan is a written plan for managing and treating your asthma attacks. It includes a list of your asthma triggers and how they may be avoided. It also includes information on when medicines should be taken and when their dosage should be changed. An action plan may also involve the use of a device called a peak flow meter. A peak flow meter measures how well the lungs are working. It helps you monitor your condition.  HOME CARE INSTRUCTIONS   · Take medicines only as directed by your health care provider. Speak with your health care  provider if you have questions about how or when to take the medicines.  · Use a peak flow meter as directed by your health care provider. Record and keep track of readings.  · Understand and use the action plan to help minimize or stop an asthma attack without needing to seek medical care.  · Control your home environment in the following ways to help prevent asthma attacks:  ¨ Do not smoke. Avoid being exposed to secondhand smoke.  ¨ Change your heating and air conditioning filter regularly.  ¨ Limit your use of fireplaces and wood stoves.  ¨ Get rid of pests (such as roaches and mice) and their droppings.  ¨ Throw away plants if you see mold on them.  ¨ Clean your floors and dust regularly. Use unscented cleaning products.  ¨ Try to have someone else vacuum for you regularly. Stay out of rooms while they are being vacuumed and for a short while afterward. If you vacuum, use a dust mask from a hardware store, a double-layered or microfilter vacuum  bag, or a vacuum  with a HEPA filter.  ¨ Replace carpet with wood, tile, or vinyl michelle. Carpet can trap dander and dust.  ¨ Use allergy-proof pillows, mattress covers, and box spring covers.  ¨ Wash bed sheets and blankets every week in hot water and dry them in a dryer.  ¨ Use blankets that are made of polyester or cotton.  ¨ Clean bathrooms and terri with bleach. If possible, have someone repaint the walls in these rooms with mold-resistant paint. Keep out of the rooms that are being cleaned and painted.  ¨ Wash hands frequently.  SEEK MEDICAL CARE IF:   · You have wheezing, shortness of breath, or a cough even if taking medicine to prevent attacks.  · The colored mucus you cough up (sputum) is thicker than usual.  · Your sputum changes from clear or white to yellow, green, gray, or bloody.  · You have any problems that may be related to the medicines you are taking (such as a rash, itching, swelling, or trouble breathing).  · You are using a  reliever medicine more than 2-3 times per week.  · Your peak flow is still at 50-79% of your personal best after following your action plan for 1 hour.  · You have a fever.  SEEK IMMEDIATE MEDICAL CARE IF:   · You seem to be getting worse and are unresponsive to treatment during an asthma attack.  · You are short of breath even at rest.  · You get short of breath when doing very little physical activity.  · You have difficulty eating, drinking, or talking due to asthma symptoms.  · You develop chest pain.  · You develop a fast heartbeat.  · You have a bluish color to your lips or fingernails.  · You are light-headed, dizzy, or faint.  · Your peak flow is less than 50% of your personal best.  MAKE SURE YOU:   · Understand these instructions.  · Will watch your condition.  · Will get help right away if you are not doing well or get worse.     This information is not intended to replace advice given to you by your health care provider. Make sure you discuss any questions you have with your health care provider.     Document Released: 12/18/2006 Document Revised: 01/08/2016 Document Reviewed: 07/17/2014  [employer] Interactive Patient Education ©2016 [employer] Inc.

## 2017-09-05 NOTE — ED NOTES
"Chief Complaint   Patient presents with   • Shortness of Breath     Started at 0430 this am. Pt has hx asthma, states \"I am out of my medicaitons.\" Denies any other symptoms, CP, or recent illness       "

## 2017-12-15 ENCOUNTER — HOSPITAL ENCOUNTER (EMERGENCY)
Facility: MEDICAL CENTER | Age: 27
End: 2017-12-16
Attending: EMERGENCY MEDICINE
Payer: COMMERCIAL

## 2017-12-15 DIAGNOSIS — J45.21 MILD INTERMITTENT ASTHMA WITH EXACERBATION: ICD-10-CM

## 2017-12-15 LAB
FLUAV+FLUBV AG SPEC QL IA: NORMAL
SIGNIFICANT IND 70042: NORMAL
SITE SITE: NORMAL
SOURCE SOURCE: NORMAL

## 2017-12-15 PROCEDURE — 99284 EMERGENCY DEPT VISIT MOD MDM: CPT

## 2017-12-15 PROCEDURE — 700101 HCHG RX REV CODE 250: Performed by: EMERGENCY MEDICINE

## 2017-12-15 PROCEDURE — 94760 N-INVAS EAR/PLS OXIMETRY 1: CPT

## 2017-12-15 PROCEDURE — 94640 AIRWAY INHALATION TREATMENT: CPT

## 2017-12-15 PROCEDURE — 87400 INFLUENZA A/B EACH AG IA: CPT

## 2017-12-15 RX ORDER — PREDNISONE 20 MG/1
60 TABLET ORAL ONCE
Status: DISCONTINUED | OUTPATIENT
Start: 2017-12-15 | End: 2017-12-16 | Stop reason: HOSPADM

## 2017-12-15 RX ORDER — IPRATROPIUM BROMIDE AND ALBUTEROL SULFATE 2.5; .5 MG/3ML; MG/3ML
3 SOLUTION RESPIRATORY (INHALATION) ONCE
Status: COMPLETED | OUTPATIENT
Start: 2017-12-15 | End: 2017-12-15

## 2017-12-15 RX ADMIN — IPRATROPIUM BROMIDE AND ALBUTEROL SULFATE 3 ML: .5; 3 SOLUTION RESPIRATORY (INHALATION) at 23:54

## 2017-12-15 ASSESSMENT — PAIN SCALES - GENERAL: PAINLEVEL_OUTOF10: 0

## 2017-12-16 VITALS
TEMPERATURE: 98.7 F | HEIGHT: 69 IN | WEIGHT: 145 LBS | HEART RATE: 88 BPM | RESPIRATION RATE: 16 BRPM | OXYGEN SATURATION: 94 % | BODY MASS INDEX: 21.48 KG/M2

## 2017-12-16 RX ORDER — ALBUTEROL SULFATE 2.5 MG/3ML
2.5 SOLUTION RESPIRATORY (INHALATION) EVERY 4 HOURS PRN
Qty: 30 BULLET | Refills: 1 | Status: SHIPPED | OUTPATIENT
Start: 2017-12-16 | End: 2018-04-04

## 2017-12-16 RX ORDER — ALBUTEROL SULFATE 90 UG/1
2 AEROSOL, METERED RESPIRATORY (INHALATION) EVERY 4 HOURS PRN
Qty: 1 INHALER | Refills: 1 | Status: SHIPPED | OUTPATIENT
Start: 2017-12-16 | End: 2018-04-04

## 2017-12-16 RX ORDER — PREDNISONE 20 MG/1
40 TABLET ORAL DAILY
Qty: 8 TAB | Refills: 0 | Status: SHIPPED | OUTPATIENT
Start: 2017-12-16 | End: 2017-12-20

## 2017-12-16 NOTE — ED PROVIDER NOTES
"ED Provider Note    Scribed for Marcel Taveras M.D. by Marcel Taveras. 12/15/2017,  11:00 PM.    CHIEF COMPLAINT  Chief Complaint   Patient presents with   • Congestion   • Cough   • Shortness of Breath   • Body Aches       [facility]  Marcel Lozano is a 27 y.o. male who presents to the Emergency DepartmentFor cough, congestion, shortness of breath and body aches that started Wednesday. He took a day off work yesterday, but has not felt much better today. He has a history of asthma, and said he's been doing much better in general, but every time he gets a cold he gets wheezing. He ran out of his asthma medications \"a while ago.\" He is awake and alert, pleasant, cooperative, though has some slight hoarseness to his voice, and frequent sniffles during her exam. He denies fevers or chills or nausea or vomiting or chest pain.    REVIEW OF SYSTEMS  See HPI for further details. All other systems are negative.     PAST MEDICAL HISTORY   has a past medical history of Asthma.    SOCIAL HISTORY  Social History     Social History Main Topics   • Smoking status: Former Smoker     Packs/day: 0.25     Types: Cigarettes     Quit date: 1/18/2017   • Smokeless tobacco: Never Used   • Alcohol use Yes      Comment: Socially   • Drug use: No      Comment: marijuana-weekends, eating-denies 03/14/17   • Sexual activity: Not on file     History   Drug Use No     Comment: marijuana-weekends, eating-denies 03/14/17       SURGICAL HISTORY  patient denies any surgical history    CURRENT MEDICATIONS  Home Medications     Reviewed by Duy Olsen R.N. (Registered Nurse) on 12/15/17 at 1715  Med List Status: Complete   Medication Last Dose Status   albuterol (PROVENTIL) 2.5mg/3ml Nebu Soln solution for nebulization 12/15/2017 Active   albuterol 108 (90 Base) MCG/ACT Aero Soln inhalation aerosol 12/15/2017 Active   albuterol 108 (90 Base) MCG/ACT Aero Soln inhalation aerosol 12/15/2017 Active   ipratropium-albuterol (DUONEB) 0.5-2.5 " "(3) MG/3ML nebulizer solution 12/15/2017 Active                ALLERGIES  Allergies   Allergen Reactions   • Cat Hair Extract Runny Nose     Itchy nose, sob   • Mushroom Extract Complex Hives       PHYSICAL EXAM  VITAL SIGNS: Pulse 86   Temp 37.1 °C (98.7 °F) Comment: took tylenol at 15:00  Resp 16   Ht 1.753 m (5' 9\")   Wt 65.8 kg (145 lb)   SpO2 95%   BMI 21.41 kg/m²   Pulse ox interpretation: I interpret this pulse ox as normal.  Constitutional: Alert in no apparent distress.  HENT: No signs of trauma, Bilateral external ears normal, Nose normal.   Eyes: Conjunctiva normal, Non-icteric.   Neck: Normal range of motion, Supple, No stridor.   Lymphatic: No lymphadenopathy noted.   Cardiovascular: Regular rate and rhythm, no murmurs.   Thorax & Lungs: Soft symmetric expiratory wheezing. No tachypnea or retractions  Abdomen: Bowel sounds normal, Soft, No tenderness, No masses, No pulsatile masses. No peritoneal signs.  Skin: Warm, Dry, No erythema, No rash.   Extremities: Intact distal pulses, No edema, No cyanosis.  Musculoskeletal: Good range of motion in all major joints. No or major deformities noted.   Neurologic: Alert , Normal motor function, Normal sensory function, No focal deficits noted.   Psychiatric: Affect normal, Judgment normal, Mood normal.     DIAGNOSTIC STUDIES / PROCEDURES      LABS  Labs Reviewed   INFLUENZA RAPID     All labs reviewed by me.    RADIOLOGY  No orders to display     The radiologist's interpretation of all radiological studies have been reviewed by me.    COURSE & MEDICAL DECISION MAKING  Nursing notes, VS, PMSFHx reviewed in chart.     11:00 PM Patient seen and examined at bedside. Differential diagnosis includes but is not limited toAsthma, pneumonia, influenza, reactive airway disease exacerbated by URI. Ordered for chest x-ray and flu swab to evaluate. Patient will be treated with oral prednisone and a DuoNeb breathing treatment for his symptoms.     11:52 PM the patient's " flu swab was negative. He declined the chest x-ray due to financial concerns.Given his symmetric mild wheezing, I think deferring the xray is reasonable, since I think a viral syndrome with asthma exacerbation is most likely.     12:25 AM the patient feels better after breathing treatment. Have refilled his asthma medications, and written a prescription for a few days of steroids. He still has nasal congestion, but reports that he bought some Afrin this morning. I discussed the importance of not using it for greater than 3 days. He is discharged in improved condition.     The patient will return for new or worsening symptoms and is stable at the time of discharge.    The patient is referred to a primary physician for blood pressure management, diabetic screening, and for all other preventative health concerns.    DISPOSITION:  Patient will be discharged home in stable condition.    FOLLOW UP:  No follow-up provider specified.    OUTPATIENT MEDICATIONS:  New Prescriptions    PREDNISONE (DELTASONE) 20 MG TAB    Take 2 Tabs by mouth every day for 4 days.         FINAL IMPRESSION  1. Mild intermittent asthma with exacerbation

## 2017-12-16 NOTE — FLOWSHEET NOTE
12/15/17 2354   Events/Summary/Plan   Events/Summary/Plan SVN   Interdisciplinary Plan of Care-Goals (Indications)   Obstructive Ventilatory Defect or Pulmonary Disease without Obvious Obstruction History / Diagnosis   Interdisciplinary Plan of Care-Outcomes    Bronchodilator Outcome Diminished Wheezing and Volume of Air Movement Increased;Patient at Stable Baseline   Education   Education Yes - Pt. / Family has been Instructed in use of Respiratory Equipment   RT Assessment of Delivered Medications   Evaluation of Medication Delivery Daily Yes-- Pt /Family has been Instructed in use of Respiratory Medications and Adverse Reactions   SVN Group   #SVN Performed Yes   Given By: Mouthpiece   Date SVN Last Changed 12/15/17   Date SVN Next Change Due (Q 7 Days) 12/22/17   Respiratory WDL   Respiratory (WDL) X   Chest Exam   Respiration 16   Pulse 86   Breath Sounds   Pre/Post Intervention Pre Intervention Assessment   RUL Breath Sounds Expiratory Wheezes;Inspiratory Wheezes   RML Breath Sounds Expiratory Wheezes;Inspiratory Wheezes   RLL Breath Sounds Expiratory Wheezes;Inspiratory Wheezes   DREW Breath Sounds Expiratory Wheezes;Inspiratory Wheezes   LLL Breath Sounds Expiratory Wheezes;Inspiratory Wheezes   Oximetry   #Pulse Oximetry (Single Determination) Yes   Oxygen   Home O2 Use Prior To Admission? No   Pulse Oximetry 95 %   O2 (LPM) 0   O2 (FiO2) 21   O2 Daily Delivery Respiratory  Room Air with O2 Available   Room Air Challenge Pass   increased aeration post treatment, resolve of inspiratory wheezing, patient states that they feel a lot better

## 2017-12-16 NOTE — DISCHARGE INSTRUCTIONS
Asthma Attack Prevention  Although there is no way to prevent asthma from starting, you can take steps to control the disease and reduce its symptoms. Learn about your asthma and how to control it. Take an active role to control your asthma by working with your health care provider to create and follow an asthma action plan. An asthma action plan guides you in:  · Taking your medicines properly.  · Avoiding things that set off your asthma or make your asthma worse (asthma triggers).  · Tracking your level of asthma control.  · Responding to worsening asthma.  · Seeking emergency care when needed.  To track your asthma, keep records of your symptoms, check your peak flow number using a handheld device that shows how well air moves out of your lungs (peak flow meter), and get regular asthma checkups.   WHAT ARE SOME WAYS TO PREVENT AN ASTHMA ATTACK?  · Take medicines as directed by your health care provider.  · Keep track of your asthma symptoms and level of control.  · With your health care provider, write a detailed plan for taking medicines and managing an asthma attack. Then be sure to follow your action plan. Asthma is an ongoing condition that needs regular monitoring and treatment.  · Identify and avoid asthma triggers. Many outdoor allergens and irritants (such as pollen, mold, cold air, and air pollution) can trigger asthma attacks. Find out what your asthma triggers are and take steps to avoid them.  · Monitor your breathing. Learn to recognize warning signs of an attack, such as coughing, wheezing, or shortness of breath. Your lung function may decrease before you notice any signs or symptoms, so regularly measure and record your peak airflow with a home peak flow meter.  · Identify and treat attacks early. If you act quickly, you are less likely to have a severe attack. You will also need less medicine to control your symptoms. When your peak flow measurements decrease and alert you to an upcoming attack,  take your medicine as instructed and immediately stop any activity that may have triggered the attack. If your symptoms do not improve, get medical help.  · Pay attention to increasing quick-relief inhaler use. If you find yourself relying on your quick-relief inhaler, your asthma is not under control. See your health care provider about adjusting your treatment.  WHAT CAN MAKE MY SYMPTOMS WORSE?  A number of common things can set off or make your asthma symptoms worse and cause temporary increased inflammation of your airways. Keep track of your asthma symptoms for several weeks, detailing all the environmental and emotional factors that are linked with your asthma. When you have an asthma attack, go back to your asthma diary to see which factor, or combination of factors, might have contributed to it. Once you know what these factors are, you can take steps to control many of them. If you have allergies and asthma, it is important to take asthma prevention steps at home. Minimizing contact with the substance to which you are allergic will help prevent an asthma attack. Some triggers and ways to avoid these triggers are:  Animal Dander:   Some people are allergic to the flakes of skin or dried saliva from animals with fur or feathers.   · There is no such thing as a hypoallergenic dog or cat breed. All dogs or cats can cause allergies, even if they don't shed.  · Keep these pets out of your home.  · If you are not able to keep a pet outdoors, keep the pet out of your bedroom and other sleeping areas at all times, and keep the door closed.  · Remove carpets and furniture covered with cloth from your home. If that is not possible, keep the pet away from fabric-covered furniture and carpets.  Dust Mites:  Many people with asthma are allergic to dust mites. Dust mites are tiny bugs that are found in every home in mattresses, pillows, carpets, fabric-covered furniture, bedcovers, clothes, stuffed toys, and other  fabric-covered items.   · Cover your mattress in a special dust-proof cover.  · Cover your pillow in a special dust-proof cover, or wash the pillow each week in hot water. Water must be hotter than 130° F (54.4° C) to kill dust mites. Cold or warm water used with detergent and bleach can also be effective.  · Wash the sheets and blankets on your bed each week in hot water.  · Try not to sleep or lie on cloth-covered cushions.  · Call ahead when traveling and ask for a smoke-free hotel room. Bring your own bedding and pillows in case the hotel only supplies feather pillows and down comforters, which may contain dust mites and cause asthma symptoms.  · Remove carpets from your bedroom and those laid on concrete, if you can.  · Keep stuffed toys out of the bed, or wash the toys weekly in hot water or cooler water with detergent and bleach.  Cockroaches:  Many people with asthma are allergic to the droppings and remains of cockroaches.   · Keep food and garbage in closed containers. Never leave food out.  · Use poison baits, traps, powders, gels, or paste (for example, boric acid).  · If a spray is used to kill cockroaches, stay out of the room until the odor goes away.  Indoor Mold:  · Fix leaky faucets, pipes, or other sources of water that have mold around them.  · Clean floors and moldy surfaces with a fungicide or diluted bleach.  · Avoid using humidifiers, vaporizers, or swamp coolers. These can spread molds through the air.  Pollen and Outdoor Mold:  · When pollen or mold spore counts are high, try to keep your windows closed.  · Stay indoors with windows closed from late morning to afternoon. Pollen and some mold spore counts are highest at that time.  · Ask your health care provider whether you need to take anti-inflammatory medicine or increase your dose of the medicine before your allergy season starts.  Other Irritants to Avoid:  · Tobacco smoke is an irritant. If you smoke, ask your health care provider how  you can quit. Ask family members to quit smoking, too. Do not allow smoking in your home or car.  · If possible, do not use a wood-burning stove, kerosene heater, or fireplace. Minimize exposure to all sources of smoke, including incense, candles, fires, and fireworks.  · Try to stay away from strong odors and sprays, such as perfume, talcum powder, hair spray, and paints.  · Decrease humidity in your home and use an indoor air cleaning device. Reduce indoor humidity to below 60%. Dehumidifiers or central air conditioners can do this.  · Decrease house dust exposure by changing furnace and air cooler filters frequently.  · Try to have someone else vacuum for you once or twice a week. Stay out of rooms while they are being vacuumed and for a short while afterward.  · If you vacuum, use a dust mask from a hardware store, a double-layered or microfilter vacuum  bag, or a vacuum  with a HEPA filter.  · Sulfites in foods and beverages can be irritants. Do not drink beer or wine or eat dried fruit, processed potatoes, or shrimp if they cause asthma symptoms.  · Cold air can trigger an asthma attack. Cover your nose and mouth with a scarf on cold or windy days.  · Several health conditions can make asthma more difficult to manage, including a runny nose, sinus infections, reflux disease, psychological stress, and sleep apnea. Work with your health care provider to manage these conditions.  · Avoid close contact with people who have a respiratory infection such as a cold or the flu, since your asthma symptoms may get worse if you catch the infection. Wash your hands thoroughly after touching items that may have been handled by people with a respiratory infection.  · Get a flu shot every year to protect against the flu virus, which often makes asthma worse for days or weeks. Also get a pneumonia shot if you have not previously had one. Unlike the flu shot, the pneumonia shot does not need to be given  yearly.  Medicines:  · Talk to your health care provider about whether it is safe for you to take aspirin or non-steroidal anti-inflammatory medicines (NSAIDs). In a small number of people with asthma, aspirin and NSAIDs can cause asthma attacks. These medicines must be avoided by people who have known aspirin-sensitive asthma. It is important that people with aspirin-sensitive asthma read labels of all over-the-counter medicines used to treat pain, colds, coughs, and fever.  · Beta-blockers and ACE inhibitors are other medicines you should discuss with your health care provider.  HOW CAN I FIND OUT WHAT I AM ALLERGIC TO?  Ask your asthma health care provider about allergy skin testing or blood testing (the RAST test) to identify the allergens to which you are sensitive. If you are found to have allergies, the most important thing to do is to try to avoid exposure to any allergens that you are sensitive to as much as possible. Other treatments for allergies, such as medicines and allergy shots (immunotherapy) are available.   CAN I EXERCISE?  Follow your health care provider's advice regarding asthma treatment before exercising. It is important to maintain a regular exercise program, but vigorous exercise or exercise in cold, humid, or dry environments can cause asthma attacks, especially for those people who have exercise-induced asthma.     This information is not intended to replace advice given to you by your health care provider. Make sure you discuss any questions you have with your health care provider.     Document Released: 12/06/2010 Document Revised: 12/23/2014 Document Reviewed: 06/25/2014  [employer] Interactive Patient Education ©2016 [employer]

## 2017-12-16 NOTE — ED NOTES
Patient arrives to ER with body aches, congestion, cough, shortness of breath that began Wednesday.  He has a hoarse voice

## 2017-12-17 ENCOUNTER — PATIENT OUTREACH (OUTPATIENT)
Dept: HEALTH INFORMATION MANAGEMENT | Facility: OTHER | Age: 27
End: 2017-12-17

## 2017-12-17 NOTE — PROGRESS NOTES
Placed discharge outreach phone call to patient s/p ER discharge 12/16/17.  Left voicemail providing my contact information and instructions to call with any questions or concerns.

## 2017-12-26 ENCOUNTER — RESOLUTE PROFESSIONAL BILLING HOSPITAL PROF FEE (OUTPATIENT)
Dept: HOSPITALIST | Facility: MEDICAL CENTER | Age: 27
End: 2017-12-26
Payer: COMMERCIAL

## 2018-03-11 ENCOUNTER — HOSPITAL ENCOUNTER (EMERGENCY)
Facility: MEDICAL CENTER | Age: 28
End: 2018-03-11
Payer: COMMERCIAL

## 2018-03-11 VITALS
TEMPERATURE: 98.8 F | OXYGEN SATURATION: 91 % | WEIGHT: 131.39 LBS | RESPIRATION RATE: 16 BRPM | DIASTOLIC BLOOD PRESSURE: 59 MMHG | BODY MASS INDEX: 19.46 KG/M2 | HEIGHT: 69 IN | SYSTOLIC BLOOD PRESSURE: 119 MMHG | HEART RATE: 102 BPM

## 2018-03-11 PROCEDURE — 302449 STATCHG TRIAGE ONLY (STATISTIC)

## 2018-03-11 ASSESSMENT — PAIN SCALES - GENERAL: PAINLEVEL_OUTOF10: 4

## 2018-03-12 NOTE — ED TRIAGE NOTES
"Marcel Lozano  27 y.o. male  Chief Complaint   Patient presents with   • Congestion     pt.s valeria he has congest since friday. Pt. states he has been coughing up dark phlegm. Pt. has hx of asthma. Pt. has been using a home nebulizer with some relief. Pt. wearing mask and coughing in triage.   • Chest Wall Pain     Pt. states chest wall pain with inspiration and expiration.     /59   Pulse (!) 102   Temp 37.1 °C (98.8 °F)   Resp 16   Ht 1.753 m (5' 9\")   Wt 59.6 kg (131 lb 6.3 oz)   SpO2 91%   BMI 19.40 kg/m²     Pt amb to triage with steady gait for above complaint.   Pt is alert and oriented, speaking in full sentences, follows commands and responds appropriately to questions. NAD. Resp are even and unlabored.  Pt placed in lobby. Pt educated on triage process. Pt encouraged to alert staff for any changes.  "

## 2018-04-04 ENCOUNTER — HOSPITAL ENCOUNTER (EMERGENCY)
Facility: MEDICAL CENTER | Age: 28
End: 2018-04-04
Attending: EMERGENCY MEDICINE
Payer: COMMERCIAL

## 2018-04-04 VITALS
HEIGHT: 69 IN | RESPIRATION RATE: 18 BRPM | TEMPERATURE: 98 F | SYSTOLIC BLOOD PRESSURE: 129 MMHG | BODY MASS INDEX: 20.24 KG/M2 | WEIGHT: 136.69 LBS | DIASTOLIC BLOOD PRESSURE: 70 MMHG | OXYGEN SATURATION: 96 % | HEART RATE: 112 BPM

## 2018-04-04 DIAGNOSIS — J45.901 MODERATE ASTHMA WITH ACUTE EXACERBATION, UNSPECIFIED WHETHER PERSISTENT: ICD-10-CM

## 2018-04-04 PROCEDURE — 700111 HCHG RX REV CODE 636 W/ 250 OVERRIDE (IP): Performed by: EMERGENCY MEDICINE

## 2018-04-04 PROCEDURE — 99284 EMERGENCY DEPT VISIT MOD MDM: CPT

## 2018-04-04 PROCEDURE — 700101 HCHG RX REV CODE 250

## 2018-04-04 PROCEDURE — 94640 AIRWAY INHALATION TREATMENT: CPT

## 2018-04-04 RX ORDER — IPRATROPIUM BROMIDE AND ALBUTEROL SULFATE 2.5; .5 MG/3ML; MG/3ML
3 SOLUTION RESPIRATORY (INHALATION)
Status: DISCONTINUED | OUTPATIENT
Start: 2018-04-04 | End: 2018-04-04 | Stop reason: HOSPADM

## 2018-04-04 RX ORDER — ALBUTEROL SULFATE 2.5 MG/3ML
2.5 SOLUTION RESPIRATORY (INHALATION) EVERY 4 HOURS PRN
Qty: 30 BULLET | Refills: 5 | Status: SHIPPED | OUTPATIENT
Start: 2018-04-04 | End: 2019-06-02 | Stop reason: SDUPTHER

## 2018-04-04 RX ORDER — PREDNISONE 20 MG/1
60 TABLET ORAL ONCE
Status: COMPLETED | OUTPATIENT
Start: 2018-04-04 | End: 2018-04-04

## 2018-04-04 RX ORDER — PREDNISONE 20 MG/1
40 TABLET ORAL DAILY
Qty: 10 TAB | Refills: 0 | Status: SHIPPED | OUTPATIENT
Start: 2018-04-04 | End: 2018-04-09

## 2018-04-04 RX ORDER — ALBUTEROL SULFATE 90 UG/1
2 AEROSOL, METERED RESPIRATORY (INHALATION) EVERY 4 HOURS PRN
Qty: 1 INHALER | Refills: 5 | Status: SHIPPED | OUTPATIENT
Start: 2018-04-04 | End: 2018-06-27 | Stop reason: SDUPTHER

## 2018-04-04 RX ADMIN — ALBUTEROL SULFATE 15 MG/ML: 5 SOLUTION RESPIRATORY (INHALATION) at 04:21

## 2018-04-04 RX ADMIN — PREDNISONE 60 MG: 20 TABLET ORAL at 03:44

## 2018-04-04 ASSESSMENT — PAIN SCALES - GENERAL: PAINLEVEL_OUTOF10: 0

## 2018-04-04 NOTE — DISCHARGE INSTRUCTIONS
Asthma FAQ  Asthma is a serious condition that causes breathing problems. Some severe attacks can cause death. Wear a bracelet or chain that lets others know you have asthma.  HOW DID I GET ASTHMA?  You might have been born with asthma, or you might be allergic to something that causes an asthma attack.  WHAT CAUSES AN ASTHMA ATTACK?  There are many things that can cause an asthma attack. Some of the most common causes are:  · Grass, weed, or tree pollen in the air.   · Air pollution.   · Dust.   · Heavy or hard exercise.   · Emotional upset.   · Infections.   · Smoking and secondhand smoke.   · Some medicines like aspirin.   · Allergies to:   · Animals such as cats, dogs, or rabbits.   · Certain foods like wheat, rye, nuts, or shellfish.   HOW DO I KEEP FROM HAVING AN ATTACK?  · Refill your medicines before they run out.   · Always take your medicine like the doctor tells you. This will help prevent an asthma attack.   · If you use an inhaler or diskus, carry it with you at all times.   · Stay indoors as much as possible on ozone alert days. This is when the weather is very cold and when the pollen count is high.   · Talk to your nurse or doctor about relaxation techniques that might help.   · Stop smoking and stay away from smoke.   WHAT HAPPENS DURING AN ASTHMA ATTACK?  The airways in your lungs get smaller and puff up (swell). You:  · Have a hard time breathing.   · Wheeze.   · Cough and produce a lot of mucus.   HOW DO I STOP AN ATTACK?  · Take medication as directed by your doctor.   · If your medicine is not helping, call your local emergency medical services, and go to the emergency room.   Document Released: 09/26/2009 Document Revised: 03/11/2013 Document Reviewed: 09/26/2009  [employer]® Patient Information ©2013 [employer].

## 2018-04-04 NOTE — ED PROVIDER NOTES
ED Provider Note    CHIEF COMPLAINT  Chief Complaint   Patient presents with   • Asthma     Pt reports running out of nebulizer and inhaler; last dose was yesterday 11am; pt with hx of asthma   • Shortness of Breath       HPI  Marcelmaicol Lozano is a 27 y.o. male who presents with chief complaint of wheezing and shortness of breath for 3 days. Associated nonproductive cough. Patient reports symptoms are likely secondary to allergies. Patient reports symptoms are identical to prior episodes of asthma. Patient has been admitted for prior asthma episodes but denies any history of intubation. His last prednisone use was about 4 months ago. He denies any associated chest pain. He denies any associated lower extremity edema.    REVIEW OF SYSTEMS  Review of Systems   All other systems reviewed and are negative.    See HPI for further details. All other systems are negative.     PAST MEDICAL HISTORY   has a past medical history of Asthma.    SOCIAL HISTORY  Social History     Social History Main Topics   • Smoking status: Former Smoker     Packs/day: 0.25     Types: Cigarettes     Quit date: 1/18/2017   • Smokeless tobacco: Never Used   • Alcohol use Yes      Comment: Socially   • Drug use: No      Comment: marijuana-weekends, eating-denies 03/14/17   • Sexual activity: Not on file       SURGICAL HISTORY  patient denies any surgical history    CURRENT MEDICATIONS  Home Medications     Reviewed by Sheila Costa R.N. (Registered Nurse) on 04/04/18 at 0339  Med List Status: Partial   Medication Last Dose Status   albuterol (PROVENTIL) 2.5mg/3ml Nebu Soln solution for nebulization  Active   albuterol 108 (90 Base) MCG/ACT Aero Soln inhalation aerosol  Active   ipratropium-albuterol (DUONEB) 0.5-2.5 (3) MG/3ML nebulizer solution 12/15/2017 Active                ALLERGIES  Allergies   Allergen Reactions   • Cat Hair Extract Runny Nose     Itchy nose, sob   • Mushroom Extract Complex Hives       PHYSICAL EXAM  Physical  Exam   Constitutional: He is oriented to person, place, and time. He appears well-developed and well-nourished.   HENT:   Head: Normocephalic.   Eyes: Conjunctivae are normal. Pupils are equal, round, and reactive to light.   Neck: Normal range of motion. Neck supple.   Cardiovascular: Normal rate and regular rhythm.    Pulmonary/Chest: Effort normal. He has no rales.   Diffuse wheezing and rhonchi throughout   Abdominal: Soft. Bowel sounds are normal.   Neurological: He is alert and oriented to person, place, and time.   Skin: Skin is warm and dry. No rash noted.   Psychiatric: He has a normal mood and affect.       COURSE & MEDICAL DECISION MAKING  Pertinent Labs & Imaging studies reviewed. (See chart for details)  Patient here with history and physical consistent with asthma exacerbation. Moderate asthma exacerbation. We'll give prednisone and DuoNeb. Given patient's considerable wheezing throughout Will start with hour-long. No focal findings on patient's exam to suggest underlying pneumonia.    4:18 AM  On 30 minute reassessment patient's wheezing has ceased and patient reports his symptoms have also resolved. We will start treatment at this point given the patient's symptoms entirely resolved. Patient observed and made sure that symptoms are not worsened. He will be sent home on an inhaled steroid given his multiple exacerbations. Home also with prednisone burst and return precautions discussed.  The patient will return for worsening symptoms and is stable at the time of discharge. The patient verbalizes understanding and will comply.    FINAL IMPRESSION  1. Asthma exacerbation         Electronically signed by: Franck Wing, 4/4/2018 3:46 AM

## 2018-04-04 NOTE — ED NOTES
Pt given written and oral discharge instructions. Pt verbalized understanding of all instructions given. All questions answered.VSS. Pt given paper prescriptions as ordered and educated on use. Pt verbalized understanding. Pt educated on s/s of when to return to ER. Pt ambulating independently upon time of discharge in good condition.

## 2018-04-05 ENCOUNTER — PATIENT OUTREACH (OUTPATIENT)
Dept: HEALTH INFORMATION MANAGEMENT | Facility: OTHER | Age: 28
End: 2018-04-05

## 2018-06-27 ENCOUNTER — OFFICE VISIT (OUTPATIENT)
Dept: MEDICAL GROUP | Facility: MEDICAL CENTER | Age: 28
End: 2018-06-27
Payer: COMMERCIAL

## 2018-06-27 VITALS
HEIGHT: 69 IN | OXYGEN SATURATION: 94 % | DIASTOLIC BLOOD PRESSURE: 66 MMHG | RESPIRATION RATE: 14 BRPM | SYSTOLIC BLOOD PRESSURE: 106 MMHG | HEART RATE: 71 BPM | WEIGHT: 137.8 LBS | BODY MASS INDEX: 20.41 KG/M2 | TEMPERATURE: 97 F

## 2018-06-27 DIAGNOSIS — R68.82 DECREASED LIBIDO WITHOUT SEXUAL DYSFUNCTION: ICD-10-CM

## 2018-06-27 DIAGNOSIS — J45.40 MODERATE PERSISTENT ASTHMA, UNSPECIFIED WHETHER COMPLICATED: ICD-10-CM

## 2018-06-27 PROBLEM — J40 BRONCHITIS: Status: RESOLVED | Noted: 2017-02-01 | Resolved: 2018-06-27

## 2018-06-27 PROCEDURE — 99214 OFFICE O/P EST MOD 30 MIN: CPT | Performed by: NURSE PRACTITIONER

## 2018-06-27 RX ORDER — ALBUTEROL SULFATE 90 UG/1
2 AEROSOL, METERED RESPIRATORY (INHALATION) EVERY 4 HOURS PRN
Qty: 1 INHALER | Refills: 5 | Status: SHIPPED | OUTPATIENT
Start: 2018-06-27 | End: 2019-01-02 | Stop reason: SDUPTHER

## 2018-06-27 ASSESSMENT — PATIENT HEALTH QUESTIONNAIRE - PHQ9: CLINICAL INTERPRETATION OF PHQ2 SCORE: 0

## 2018-06-27 NOTE — PROGRESS NOTES
CC: To establish care and asthma      Marcel Franck Lozano is a 27 y.o. male here to establish care and to discuss the evaluation and management of:    Moderate persistent asthma, unspecified whether complicated  Patient states that he has had asthma his whole life.  States that he has not had a regular PCP to help treat his asthma.  States he has had multiple emergency room visits, most recently in April for asthma exacerbation.  States he did not  the new inhaler that they had prescribed.  States that he uses his rescue inhaler about 3 times a day.  He is allergic to dogs and he does currently alone a few dogs at home.  Also states that he does have environmental allergies.  States when he plays basketball he also has an exacerbation and cannot play as much as he would like.  Patient states it has been cold outside he really starts sneezing and I can get him worked up he also states that when the weather changes this can cause him some difficulty as well.       Decreased libido  Patient states that he also wants to discuss the fact that he does have a decreased sexual drive.  States that he has been this way with all of his female partners in the past.  Feels like it is not a problem however his girlfriend is consistently bringing that to the attention and causing them arguments.  Patient denies any depression or anxiety.  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  States that currently his girlfriend does not work and has not worked for the last 3 years so he primarily takes care of all the financial burden.      ROS:  Denies any Headache, Blurred Vision, Confusion Chest pain,  Shortness of breath,  Abdominal pain, Changes of bowel or bladder, Lower ext edema, Fevers, Nights sweats, Weight Changes, Focal weakness or numbness.  All other systems are negative.  Decreased libido, asthma exacerbation      Current Outpatient Prescriptions:   •  albuterol 108 (90  "Base) MCG/ACT Aero Soln inhalation aerosol, Inhale 2 Puffs by mouth every four hours as needed for Shortness of Breath., Disp: 1 Inhaler, Rfl: 5  •  Mometasone Furo-Formoterol Fum 100-5 MCG/ACT Aerosol, Inhale 1 Inhalation by mouth 2 Times a Day., Disp: 1 Inhaler, Rfl: 2  •  albuterol (PROVENTIL) 2.5mg/3ml Nebu Soln solution for nebulization, 3 mL by Nebulization route every four hours as needed for Shortness of Breath., Disp: 30 Bullet, Rfl: 5  •  ipratropium-albuterol (DUONEB) 0.5-2.5 (3) MG/3ML nebulizer solution, 3 mL by Nebulization route 4 times a day., Disp: , Rfl:     Allergies   Allergen Reactions   • Cat Hair Extract Runny Nose     Itchy nose, sob   • Mushroom Extract Complex Hives       Past Medical History:   Diagnosis Date   • Asthma      History reviewed. No pertinent surgical history.  Family History   Problem Relation Age of Onset   • Heart Disease Paternal Grandmother      Social History     Social History   • Marital status: Single     Spouse name: N/A   • Number of children: N/A   • Years of education: N/A     Occupational History   • Not on file.     Social History Main Topics   • Smoking status: Former Smoker     Packs/day: 0.25     Types: Cigarettes     Quit date: 1/18/2017   • Smokeless tobacco: Never Used   • Alcohol use Yes      Comment: Socially   • Drug use: Yes     Types: Inhaled, Marijuana, Oral   • Sexual activity: Not on file     Other Topics Concern   • Not on file     Social History Narrative   • No narrative on file       Objective:     Vitals: /66   Pulse 71   Temp 36.1 °C (97 °F)   Resp 14   Ht 1.753 m (5' 9\")   Wt 62.5 kg (137 lb 12.8 oz)   SpO2 94%   BMI 20.35 kg/m²      General: Alert, pleasant, NAD  HEENT:  Normocephalic. Neck supple.  No thyromegaly or masses palpated. No cervical or supraclavicular lymphadenopathy.  Heart:  Regular rate and rhythm.  S1 and S2 normal.  No murmurs appreciated.  Respiratory: Expiratory wheezes heard in right and left upper lobes " clear to auscultation bilaterally.   Skin:  Warm, dry, no rashes  Musculoskeletal:  Gait is normal.  Moves all extremities well.  Extremities: . No leg edema.  Neurological: No tremors, sensation grossly intact  Psych:  Affect/mood is normal, judgement is good, memory is intact, grooming is appropriate.      Assessment and Plan.   27 y.o. male to establish care and discuss the following     1. Moderate persistent asthma, unspecified whether complicated  Not well controlled at this time.  Patient has been using his rescue inhaler 3 times a day.  Will start patient on a combination inhaled corticosteroid as well as a LABA.  Patient will follow back up in 1 month.  We will likely order PFTs at that time.    - albuterol 108 (90 Base) MCG/ACT Aero Soln inhalation aerosol; Inhale 2 Puffs by mouth every four hours as needed for Shortness of Breath.  Dispense: 1 Inhaler; Refill: 5  - Mometasone Furo-Formoterol Fum 100-5 MCG/ACT Aerosol; Inhale 1 Inhalation by mouth 2 Times a Day.  Dispense: 1 Inhaler; Refill: 2    2. Decreased libido without sexual dysfunction  Discussed with patient that there could be many factors to having a decreased libido.  This could be his baseline as he states that this has how he hasalways been like this with every relationship in the past, stress, he is the primary breadwinner, caring for children, lack of interest or depression.  Discussed with patient if he would like to try to work this up further as far as doing any labs or checking his testosterone we can however patient declines at this time.  Advised patient to have a conversation with his girlfriend to discuss other means of showing infection and attention.    Health Maintenance:     Return in about 1 month (around 7/27/2018).          Keyonna CLAY

## 2018-07-27 ENCOUNTER — OFFICE VISIT (OUTPATIENT)
Dept: MEDICAL GROUP | Facility: MEDICAL CENTER | Age: 28
End: 2018-07-27
Payer: COMMERCIAL

## 2018-07-27 VITALS
OXYGEN SATURATION: 94 % | RESPIRATION RATE: 16 BRPM | DIASTOLIC BLOOD PRESSURE: 62 MMHG | HEIGHT: 69 IN | SYSTOLIC BLOOD PRESSURE: 104 MMHG | TEMPERATURE: 97.2 F | BODY MASS INDEX: 19.93 KG/M2 | WEIGHT: 134.6 LBS | HEART RATE: 96 BPM

## 2018-07-27 DIAGNOSIS — J45.40 MODERATE PERSISTENT ASTHMA, UNSPECIFIED WHETHER COMPLICATED: ICD-10-CM

## 2018-07-27 PROCEDURE — 99213 OFFICE O/P EST LOW 20 MIN: CPT | Performed by: NURSE PRACTITIONER

## 2018-07-27 RX ORDER — BUDESONIDE AND FORMOTEROL FUMARATE DIHYDRATE 80; 4.5 UG/1; UG/1
2 AEROSOL RESPIRATORY (INHALATION) 2 TIMES DAILY
Qty: 1 INHALER | Refills: 3 | Status: SHIPPED | OUTPATIENT
Start: 2018-07-27 | End: 2018-12-14 | Stop reason: CLARIF
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
## 2018-07-27 NOTE — LETTER
July 27, 2018        Marcelmaicol VO Davontekiersten  [address]  [facility] [address]        To Whom it may concern:    Please excuse Marcel Lozano from work today 7/27/2018 as he was seen in the clinic today.     If you have any questions or concerns, please don't hesitate to call.        Sincerely,        SHANNEN Power.    Electronically Signed

## 2018-10-20 ENCOUNTER — NON-PROVIDER VISIT (OUTPATIENT)
Dept: URGENT CARE | Facility: CLINIC | Age: 28
End: 2018-10-20

## 2018-10-20 DIAGNOSIS — Z11.1 PPD SCREENING TEST: ICD-10-CM

## 2018-10-20 PROCEDURE — 86580 TB INTRADERMAL TEST: CPT | Performed by: NURSE PRACTITIONER

## 2018-10-20 NOTE — NON-PROVIDER
Marcel VO Marta is a 28 y.o. male here for a non-provider visit for PPD placement -- Step 1 of 1    Reason for PPD:  work requirement    1. TB evaluation questionnaire completed by patient? Yes      -  If any answers marked yes did you contact a provider prior to placing? Yes  2.  Patient notified to return to clinic for reading on: Between 10/22/18 and 10/23/18  3.  PPD Placement documentation completed on TB evaluation questionnaire? Yes  4.  Location of TB evaluation questionnaire filed: [facility]

## 2018-12-10 ENCOUNTER — NON-PROVIDER VISIT (OUTPATIENT)
Dept: URGENT CARE | Facility: CLINIC | Age: 28
End: 2018-12-10

## 2018-12-10 DIAGNOSIS — Z11.1 PPD SCREENING TEST: ICD-10-CM

## 2018-12-10 PROCEDURE — 86580 TB INTRADERMAL TEST: CPT | Performed by: NURSE PRACTITIONER

## 2018-12-10 NOTE — PROGRESS NOTES
.MAPPDPLACEMENT ( 17045 )  1- TB Evaluation questionnaire completed by patient  2- Patient notified to return to clinic for reading between 48-72 hours  3- PPD Placement documentation completed on TB questionnaire  4- TB evaluation questionnaire filed at   location.

## 2018-12-12 ENCOUNTER — NON-PROVIDER VISIT (OUTPATIENT)
Dept: URGENT CARE | Facility: CLINIC | Age: 28
End: 2018-12-12

## 2018-12-12 LAB — TB WHEAL 3D P 5 TU DIAM: NORMAL MM

## 2018-12-13 NOTE — NON-PROVIDER
Marcel VO Marta is a 28 y.o. male here for a non-provider visit for PPD reading -- Step 1 of 1.      1.  Resulted in Epic under enter/edit results? Yes   2.  TB evaluation questionnaire scanned into chart and original given to patient?Yes      3. Was induration greater than 0 mm? No.      Routed to PCP? No

## 2018-12-14 ENCOUNTER — APPOINTMENT (OUTPATIENT)
Dept: RADIOLOGY | Facility: MEDICAL CENTER | Age: 28
DRG: 202 | End: 2018-12-14
Attending: INTERNAL MEDICINE
Payer: COMMERCIAL

## 2018-12-14 ENCOUNTER — HOSPITAL ENCOUNTER (EMERGENCY)
Facility: MEDICAL CENTER | Age: 28
End: 2018-12-14
Attending: EMERGENCY MEDICINE
Payer: COMMERCIAL

## 2018-12-14 ENCOUNTER — HOSPITAL ENCOUNTER (INPATIENT)
Facility: MEDICAL CENTER | Age: 28
LOS: 1 days | DRG: 202 | End: 2018-12-17
Attending: EMERGENCY MEDICINE | Admitting: FAMILY MEDICINE
Payer: COMMERCIAL

## 2018-12-14 VITALS
TEMPERATURE: 99 F | SYSTOLIC BLOOD PRESSURE: 112 MMHG | HEIGHT: 69 IN | RESPIRATION RATE: 20 BRPM | DIASTOLIC BLOOD PRESSURE: 76 MMHG | WEIGHT: 130.07 LBS | HEART RATE: 99 BPM | OXYGEN SATURATION: 92 % | BODY MASS INDEX: 19.27 KG/M2

## 2018-12-14 DIAGNOSIS — J45.41 MODERATE PERSISTENT ASTHMA WITH ACUTE EXACERBATION: ICD-10-CM

## 2018-12-14 LAB — EKG IMPRESSION: NORMAL

## 2018-12-14 PROCEDURE — 700102 HCHG RX REV CODE 250 W/ 637 OVERRIDE(OP): Performed by: INTERNAL MEDICINE

## 2018-12-14 PROCEDURE — 94760 N-INVAS EAR/PLS OXIMETRY 1: CPT

## 2018-12-14 PROCEDURE — 94640 AIRWAY INHALATION TREATMENT: CPT

## 2018-12-14 PROCEDURE — 96374 THER/PROPH/DIAG INJ IV PUSH: CPT

## 2018-12-14 PROCEDURE — 99220 PR INITIAL OBSERVATION CARE,LEVL III: CPT | Performed by: INTERNAL MEDICINE

## 2018-12-14 PROCEDURE — 700111 HCHG RX REV CODE 636 W/ 250 OVERRIDE (IP): Performed by: INTERNAL MEDICINE

## 2018-12-14 PROCEDURE — 96375 TX/PRO/DX INJ NEW DRUG ADDON: CPT

## 2018-12-14 PROCEDURE — 93005 ELECTROCARDIOGRAM TRACING: CPT | Performed by: EMERGENCY MEDICINE

## 2018-12-14 PROCEDURE — 99285 EMERGENCY DEPT VISIT HI MDM: CPT

## 2018-12-14 PROCEDURE — 700101 HCHG RX REV CODE 250: Performed by: EMERGENCY MEDICINE

## 2018-12-14 PROCEDURE — G0378 HOSPITAL OBSERVATION PER HR: HCPCS

## 2018-12-14 PROCEDURE — 700111 HCHG RX REV CODE 636 W/ 250 OVERRIDE (IP): Performed by: EMERGENCY MEDICINE

## 2018-12-14 PROCEDURE — 99284 EMERGENCY DEPT VISIT MOD MDM: CPT

## 2018-12-14 PROCEDURE — 304561 HCHG STAT O2

## 2018-12-14 PROCEDURE — 93005 ELECTROCARDIOGRAM TRACING: CPT

## 2018-12-14 PROCEDURE — 36415 COLL VENOUS BLD VENIPUNCTURE: CPT

## 2018-12-14 PROCEDURE — A9270 NON-COVERED ITEM OR SERVICE: HCPCS | Performed by: INTERNAL MEDICINE

## 2018-12-14 RX ORDER — ACETAMINOPHEN 325 MG/1
650 TABLET ORAL EVERY 6 HOURS PRN
Status: DISCONTINUED | OUTPATIENT
Start: 2018-12-14 | End: 2018-12-17 | Stop reason: HOSPADM

## 2018-12-14 RX ORDER — IPRATROPIUM BROMIDE AND ALBUTEROL SULFATE 2.5; .5 MG/3ML; MG/3ML
3 SOLUTION RESPIRATORY (INHALATION)
Status: DISCONTINUED | OUTPATIENT
Start: 2018-12-14 | End: 2018-12-17 | Stop reason: HOSPADM

## 2018-12-14 RX ORDER — PREDNISONE 20 MG/1
60 TABLET ORAL DAILY
Status: ON HOLD | COMMUNITY
Start: 2018-12-14 | End: 2018-12-17

## 2018-12-14 RX ORDER — BUDESONIDE AND FORMOTEROL FUMARATE DIHYDRATE 80; 4.5 UG/1; UG/1
2 AEROSOL RESPIRATORY (INHALATION)
Status: DISCONTINUED | OUTPATIENT
Start: 2018-12-14 | End: 2018-12-17 | Stop reason: HOSPADM

## 2018-12-14 RX ORDER — PREDNISONE 20 MG/1
60 TABLET ORAL DAILY
Qty: 12 TAB | Refills: 0 | Status: SHIPPED | OUTPATIENT
Start: 2018-12-14 | End: 2018-12-14

## 2018-12-14 RX ORDER — PREDNISONE 20 MG/1
40 TABLET ORAL DAILY
Status: DISCONTINUED | OUTPATIENT
Start: 2018-12-15 | End: 2018-12-15

## 2018-12-14 RX ORDER — METHYLPREDNISOLONE SODIUM SUCCINATE 125 MG/2ML
62.5 INJECTION, POWDER, LYOPHILIZED, FOR SOLUTION INTRAMUSCULAR; INTRAVENOUS ONCE
Status: COMPLETED | OUTPATIENT
Start: 2018-12-14 | End: 2018-12-14

## 2018-12-14 RX ORDER — ACETAMINOPHEN 500 MG
500 TABLET ORAL EVERY 6 HOURS PRN
Status: SHIPPED | COMMUNITY
End: 2021-12-15

## 2018-12-14 RX ORDER — PREDNISONE 20 MG/1
60 TABLET ORAL ONCE
Status: COMPLETED | OUTPATIENT
Start: 2018-12-14 | End: 2018-12-14

## 2018-12-14 RX ADMIN — BUDESONIDE AND FORMOTEROL FUMARATE DIHYDRATE 2 PUFF: 80; 4.5 AEROSOL RESPIRATORY (INHALATION) at 22:30

## 2018-12-14 RX ADMIN — PREDNISONE 60 MG: 20 TABLET ORAL at 10:50

## 2018-12-14 RX ADMIN — ALBUTEROL SULFATE 2.5 MG: 2.5 SOLUTION RESPIRATORY (INHALATION) at 10:37

## 2018-12-14 RX ADMIN — ALBUTEROL SULFATE 2.5 MG: 2.5 SOLUTION RESPIRATORY (INHALATION) at 11:18

## 2018-12-14 RX ADMIN — METHYLPREDNISOLONE SODIUM SUCCINATE 62.5 MG: 125 INJECTION, POWDER, FOR SOLUTION INTRAMUSCULAR; INTRAVENOUS at 23:15

## 2018-12-14 ASSESSMENT — LIFESTYLE VARIABLES
TOTAL SCORE: 0
EVER HAD A DRINK FIRST THING IN THE MORNING TO STEADY YOUR NERVES TO GET RID OF A HANGOVER: NO
HOW MANY TIMES IN THE PAST YEAR HAVE YOU HAD 5 OR MORE DRINKS IN A DAY: 0
ALCOHOL_USE: YES
TOTAL SCORE: 0
TOTAL SCORE: 0
AVERAGE NUMBER OF DAYS PER WEEK YOU HAVE A DRINK CONTAINING ALCOHOL: 1
EVER_SMOKED: YES
HAVE YOU EVER FELT YOU SHOULD CUT DOWN ON YOUR DRINKING: NO
ON A TYPICAL DAY WHEN YOU DRINK ALCOHOL HOW MANY DRINKS DO YOU HAVE: 2
HAVE PEOPLE ANNOYED YOU BY CRITICIZING YOUR DRINKING: NO
CONSUMPTION TOTAL: NEGATIVE
EVER FELT BAD OR GUILTY ABOUT YOUR DRINKING: NO

## 2018-12-14 ASSESSMENT — ENCOUNTER SYMPTOMS
SHORTNESS OF BREATH: 1
COUGH: 1
WHEEZING: 1

## 2018-12-14 ASSESSMENT — PATIENT HEALTH QUESTIONNAIRE - PHQ9
SUM OF ALL RESPONSES TO PHQ9 QUESTIONS 1 AND 2: 0
SUM OF ALL RESPONSES TO PHQ9 QUESTIONS 1 AND 2: 0
2. FEELING DOWN, DEPRESSED, IRRITABLE, OR HOPELESS: NOT AT ALL
2. FEELING DOWN, DEPRESSED, IRRITABLE, OR HOPELESS: NOT AT ALL
1. LITTLE INTEREST OR PLEASURE IN DOING THINGS: NOT AT ALL
1. LITTLE INTEREST OR PLEASURE IN DOING THINGS: NOT AT ALL

## 2018-12-14 ASSESSMENT — PAIN SCALES - GENERAL
PAINLEVEL_OUTOF10: 2
PAINLEVEL_OUTOF10: 0

## 2018-12-14 NOTE — ED NOTES
Pt discharged with written instructions. Pt verbalized understanding. Pt's AAOx4. Pt states breathing better. Pt ambulated independently from ER.

## 2018-12-14 NOTE — FLOWSHEET NOTE
12/14/18 1034   Events/Summary/Plan   Events/Summary/Plan SVN given in ER   Interdisciplinary Plan of Care-Goals (Indications)   Bronchodilator Indications History / Diagnosis   Interdisciplinary Plan of Care-Outcomes    Bronchodilator Outcome Improved Vital Signs and Measures of Gas Exchange;Patient at Stable Baseline   Education   Education Yes - Pt. / Family has been Instructed in use of Respiratory Equipment;Yes - Pt. / Family has been Instructed in use of Respiratory Medications and Adverse Reactions   SVN Group   #SVN Performed Yes   Given By: Mouthpiece   Respiratory WDL   Respiratory (WDL) X   Chest Exam   Work Of Breathing / Effort Mild   Respiration 18  (post 18)   Pulse (!) 102  (post 105)   Breath Sounds   Pre/Post Intervention Pre Intervention Assessment   RUL Breath Sounds Crackles;Expiratory Wheezes  (increased aeration following svn, louder expiratory wheezes)   RML Breath Sounds Crackles;Expiratory Wheezes   RLL Breath Sounds Diminished   DREW Breath Sounds Crackles;Expiratory Wheezes   LLL Breath Sounds Diminished   Oximetry   #Pulse Oximetry (Single Determination) Yes   Oxygen   Pulse Oximetry 93 %   O2 Daily Delivery Respiratory  Room Air with O2 Available

## 2018-12-14 NOTE — DISCHARGE INSTRUCTIONS
Use inhaler and spacer as directed.  Take prednisone as prescribed.  Return for worsening cough shortness breath fevers chills or other concerns.

## 2018-12-14 NOTE — FLOWSHEET NOTE
12/14/18 1118   Events/Summary/Plan   Events/Summary/Plan 2nd svn given in ER   Interdisciplinary Plan of Care-Goals (Indications)   Bronchodilator Indications History / Diagnosis   Interdisciplinary Plan of Care-Outcomes    Bronchodilator Outcome Improved Vital Signs and Measures of Gas Exchange;Patient at Stable Baseline   SVN Group   #SVN Performed Yes   Given By: Mouthpiece   Chest Exam   Work Of Breathing / Effort Mild   Respiration 18  (post 18)   Pulse 98  (post 114)   Heart Rate (Monitored) 98   Breath Sounds   Pre/Post Intervention Pre Intervention Assessment  (expiratory wheezes heard after svn)   RUL Breath Sounds Clear   RML Breath Sounds Clear   RLL Breath Sounds Clear   DREW Breath Sounds Clear   LLL Breath Sounds Clear   Oximetry   #Pulse Oximetry (Single Determination) Yes   Oxygen   Pulse Oximetry 92 %   O2 Daily Delivery Respiratory  Room Air with O2 Available

## 2018-12-14 NOTE — ED TRIAGE NOTES
"Pt presents with a hx of Asthma.  He C/O recurring exacerbations since last night.  Denies respiratory distress presently.  \" My inhaler dos not work.\"  "

## 2018-12-14 NOTE — ED PROVIDER NOTES
ED Provider Note    CHIEF COMPLAINT  Chief Complaint   Patient presents with   • Asthma       HPI  Marcel Lozano is a 28 y.o. male who presents to the emergency room complaining of an asthma exacerbation.  The patient has a long history of asthma and has home nebulizer machine and inhalers.  He has had a history of admissions but no intubations.  On for any sort having difficulty breathing.  Felt like asthma.  Is been using his home asthma therapy without relief.  He has a cough and chest tightness.  Cough is productive of clear sputum no discolored sputum.  No fevers.  Mild sore throat mild congestion.  No chest pain.  No abdominal pain nausea vomiting fevers or chills.  Denies any other aggravating relieving factors or associated complaints.    REVIEW OF SYSTEMS  See HPI for further details. All other systems are negative.    PAST MEDICAL HISTORY  Past Medical History:   Diagnosis Date   • Asthma        FAMILY HISTORY  Family History   Problem Relation Age of Onset   • Heart Disease Paternal Grandmother        SOCIAL HISTORY  Social History     Social History   • Marital status: Single     Spouse name: N/A   • Number of children: N/A   • Years of education: N/A     Social History Main Topics   • Smoking status: Former Smoker     Packs/day: 0.25     Types: Cigarettes     Quit date: 1/18/2017   • Smokeless tobacco: Never Used   • Alcohol use Yes      Comment: Occasionally   • Drug use: Yes     Types: Inhaled, Marijuana, Oral   • Sexual activity: Not on file     Other Topics Concern   • Not on file     Social History Narrative   • No narrative on file       SURGICAL HISTORY  History reviewed. No pertinent surgical history.    CURRENT MEDICATIONS  Home Medications    **Home medications have not yet been reviewed for this encounter**         ALLERGIES  Allergies   Allergen Reactions   • Cat Hair Extract Runny Nose     Itchy nose, sob   • Mushroom Extract Complex Hives       PHYSICAL EXAM  VITAL SIGNS: BP  "147/70   Pulse 100   Temp 37.2 °C (99 °F) (Temporal)   Resp 19   Ht 1.753 m (5' 9\")   Wt 59 kg (130 lb 1.1 oz)   SpO2 94%   BMI 19.21 kg/m²    Constitutional: Well developed, Well nourished, No acute distress, Non-toxic appearance.   HENT: Normocephalic, Atraumatic, Bilateral external ears normal, Oropharynx moist, No oral exudates, Nose normal.   Eyes: PERRL, EOMI, Conjunctiva normal, No discharge.   Neck: Normal range of motion, No tenderness, Supple, No stridor.   Lymphatic: No lymphadenopathy noted.   Cardiovascular: Normal heart rate, Normal rhythm, No murmurs, No rubs, No gallops.   Thorax & Lungs: Wheezing bilaterally.  Reasonable air movement.  Abdomen: Bowel sounds normal, Soft, No tenderness,  Skin: Warm, Dry, No erythema, No rash.   Back: No tenderness, No CVA tenderness.   Musculoskeletal: Good range of motion in all major joints.  Neurologic: Alert, No focal deficits noted.   Psychiatric: Affect normal,    COURSE & MEDICAL DECISION MAKING  Pertinent Labs & Imaging studies reviewed. (See chart for details)  He was given oral prednisone and breathing treatment.  He was observed in the emergency department and reassessed after his first breathing treatment he was still wheezing and still quite tight.    Repeat breathing treatment was performed.  After which time he felt much better.    The patient is observed in the emergency department and he will be observed here to make sure there is no signs or rebound.  If he still breathing well be discharged home.  He is never been intubated he is never been admitted to the ICU.      I think outpatient management is appropriate.  I would the patient is prescribed prednisone.  Is not adequate supply of albuterol at home.  He will return for worsening cough shortness of breath fevers or other concerns.  Discharged in stable condition.      The patient was noted to have elevated blood pressure while in the ER and was counseled to see their doctor within one wee " to have this rechecked.      Keyonna Guaman A.P.R.NMalcolm  [address] [id number] [phone]             Medication List      START taking these medications      Instructions   predniSONE 20 MG Tabs  Commonly known as:  DELTASONE   Take 3 Tabs by mouth every day for 4 days.  Dose:  60 mg        ASK your doctor about these medications      Instructions   * albuterol 2.5mg/3ml Nebu solution for nebulization  Commonly known as:  PROVENTIL   3 mL by Nebulization route every four hours as needed for Shortness of Breath.  Dose:  2.5 mg     * albuterol 108 (90 Base) MCG/ACT Aers inhalation aerosol   Inhale 2 Puffs by mouth every four hours as needed for Shortness of Breath.  Dose:  2 Puff     budesonide-formoterol 80-4.5 MCG/ACT Aero  Commonly known as:  SYMBICORT   Inhale 2 Puffs by mouth 2 Times a Day.  Dose:  2 Puff        * This list has 2 medication(s) that are the same as other medications prescribed for you. Read the directions carefully, and ask your doctor or other care provider to review them with you.                FINAL IMPRESSION  1. Moderate persistent asthma with acute exacerbation          2.   3.         Electronically signed by: Mehrdad Shin, 12/14/2018 10:29 AM

## 2018-12-15 ENCOUNTER — APPOINTMENT (OUTPATIENT)
Dept: RADIOLOGY | Facility: MEDICAL CENTER | Age: 28
DRG: 202 | End: 2018-12-15
Attending: INTERNAL MEDICINE
Payer: COMMERCIAL

## 2018-12-15 ENCOUNTER — APPOINTMENT (OUTPATIENT)
Dept: RADIOLOGY | Facility: MEDICAL CENTER | Age: 28
DRG: 202 | End: 2018-12-15
Attending: FAMILY MEDICINE
Payer: COMMERCIAL

## 2018-12-15 PROBLEM — E83.42 HYPOMAGNESEMIA: Status: ACTIVE | Noted: 2018-12-15

## 2018-12-15 PROBLEM — J45.41 MODERATE PERSISTENT ASTHMA WITH EXACERBATION: Status: ACTIVE | Noted: 2018-06-27

## 2018-12-15 PROBLEM — E87.6 HYPOKALEMIA: Status: ACTIVE | Noted: 2018-12-15

## 2018-12-15 PROBLEM — J96.01 ACUTE RESPIRATORY FAILURE WITH HYPOXIA (HCC): Status: ACTIVE | Noted: 2018-12-15

## 2018-12-15 LAB
ALBUMIN SERPL BCP-MCNC: 4.2 G/DL (ref 3.2–4.9)
ALBUMIN/GLOB SERPL: 2 G/DL
ALP SERPL-CCNC: 52 U/L (ref 30–99)
ALT SERPL-CCNC: 13 U/L (ref 2–50)
ANION GAP SERPL CALC-SCNC: 9 MMOL/L (ref 0–11.9)
AST SERPL-CCNC: 18 U/L (ref 12–45)
BASOPHILS # BLD AUTO: 0.1 % (ref 0–1.8)
BASOPHILS # BLD: 0.02 K/UL (ref 0–0.12)
BILIRUB SERPL-MCNC: 0.6 MG/DL (ref 0.1–1.5)
BUN SERPL-MCNC: 15 MG/DL (ref 8–22)
CALCIUM SERPL-MCNC: 8.5 MG/DL (ref 8.5–10.5)
CHLORIDE SERPL-SCNC: 111 MMOL/L (ref 96–112)
CO2 SERPL-SCNC: 21 MMOL/L (ref 20–33)
CREAT SERPL-MCNC: 0.69 MG/DL (ref 0.5–1.4)
EOSINOPHIL # BLD AUTO: 0 K/UL (ref 0–0.51)
EOSINOPHIL NFR BLD: 0 % (ref 0–6.9)
ERYTHROCYTE [DISTWIDTH] IN BLOOD BY AUTOMATED COUNT: 45.7 FL (ref 35.9–50)
GLOBULIN SER CALC-MCNC: 2.1 G/DL (ref 1.9–3.5)
GLUCOSE SERPL-MCNC: 114 MG/DL (ref 65–99)
HCT VFR BLD AUTO: 44 % (ref 42–52)
HGB BLD-MCNC: 15 G/DL (ref 14–18)
IMM GRANULOCYTES # BLD AUTO: 0.1 K/UL (ref 0–0.11)
IMM GRANULOCYTES NFR BLD AUTO: 0.5 % (ref 0–0.9)
LYMPHOCYTES # BLD AUTO: 1.15 K/UL (ref 1–4.8)
LYMPHOCYTES NFR BLD: 5.5 % (ref 22–41)
MAGNESIUM SERPL-MCNC: 1.5 MG/DL (ref 1.5–2.5)
MCH RBC QN AUTO: 30.4 PG (ref 27–33)
MCHC RBC AUTO-ENTMCNC: 34.1 G/DL (ref 33.7–35.3)
MCV RBC AUTO: 89.1 FL (ref 81.4–97.8)
MONOCYTES # BLD AUTO: 0.32 K/UL (ref 0–0.85)
MONOCYTES NFR BLD AUTO: 1.5 % (ref 0–13.4)
NEUTROPHILS # BLD AUTO: 19.45 K/UL (ref 1.82–7.42)
NEUTROPHILS NFR BLD: 92.4 % (ref 44–72)
NRBC # BLD AUTO: 0 K/UL
NRBC BLD-RTO: 0 /100 WBC
PLATELET # BLD AUTO: 308 K/UL (ref 164–446)
PMV BLD AUTO: 8.9 FL (ref 9–12.9)
POTASSIUM SERPL-SCNC: 3.6 MMOL/L (ref 3.6–5.5)
PROCALCITONIN SERPL-MCNC: 0.05 NG/ML
PROT SERPL-MCNC: 6.3 G/DL (ref 6–8.2)
RBC # BLD AUTO: 4.94 M/UL (ref 4.7–6.1)
SODIUM SERPL-SCNC: 141 MMOL/L (ref 135–145)
WBC # BLD AUTO: 21 K/UL (ref 4.8–10.8)

## 2018-12-15 PROCEDURE — 71046 X-RAY EXAM CHEST 2 VIEWS: CPT

## 2018-12-15 PROCEDURE — 90471 IMMUNIZATION ADMIN: CPT

## 2018-12-15 PROCEDURE — A9270 NON-COVERED ITEM OR SERVICE: HCPCS | Performed by: FAMILY MEDICINE

## 2018-12-15 PROCEDURE — 700111 HCHG RX REV CODE 636 W/ 250 OVERRIDE (IP): Performed by: INTERNAL MEDICINE

## 2018-12-15 PROCEDURE — 3E02340 INTRODUCTION OF INFLUENZA VACCINE INTO MUSCLE, PERCUTANEOUS APPROACH: ICD-10-PCS | Performed by: INTERNAL MEDICINE

## 2018-12-15 PROCEDURE — 700102 HCHG RX REV CODE 250 W/ 637 OVERRIDE(OP): Performed by: FAMILY MEDICINE

## 2018-12-15 PROCEDURE — 84145 PROCALCITONIN (PCT): CPT

## 2018-12-15 PROCEDURE — 700111 HCHG RX REV CODE 636 W/ 250 OVERRIDE (IP): Performed by: NURSE PRACTITIONER

## 2018-12-15 PROCEDURE — A9270 NON-COVERED ITEM OR SERVICE: HCPCS | Performed by: NURSE PRACTITIONER

## 2018-12-15 PROCEDURE — 36415 COLL VENOUS BLD VENIPUNCTURE: CPT

## 2018-12-15 PROCEDURE — G0378 HOSPITAL OBSERVATION PER HR: HCPCS

## 2018-12-15 PROCEDURE — 96366 THER/PROPH/DIAG IV INF ADDON: CPT

## 2018-12-15 PROCEDURE — 96376 TX/PRO/DX INJ SAME DRUG ADON: CPT

## 2018-12-15 PROCEDURE — 80053 COMPREHEN METABOLIC PANEL: CPT

## 2018-12-15 PROCEDURE — 700102 HCHG RX REV CODE 250 W/ 637 OVERRIDE(OP): Performed by: NURSE PRACTITIONER

## 2018-12-15 PROCEDURE — 85025 COMPLETE CBC W/AUTO DIFF WBC: CPT

## 2018-12-15 PROCEDURE — 96365 THER/PROPH/DIAG IV INF INIT: CPT

## 2018-12-15 PROCEDURE — 90686 IIV4 VACC NO PRSV 0.5 ML IM: CPT | Performed by: INTERNAL MEDICINE

## 2018-12-15 PROCEDURE — 99226 PR SUBSEQUENT OBSERVATION CARE,LEVEL III: CPT | Performed by: FAMILY MEDICINE

## 2018-12-15 PROCEDURE — 94640 AIRWAY INHALATION TREATMENT: CPT

## 2018-12-15 PROCEDURE — 700101 HCHG RX REV CODE 250: Performed by: INTERNAL MEDICINE

## 2018-12-15 PROCEDURE — 83735 ASSAY OF MAGNESIUM: CPT

## 2018-12-15 RX ORDER — METHYLPREDNISOLONE SODIUM SUCCINATE 125 MG/2ML
62.5 INJECTION, POWDER, LYOPHILIZED, FOR SOLUTION INTRAMUSCULAR; INTRAVENOUS EVERY 6 HOURS
Status: DISCONTINUED | OUTPATIENT
Start: 2018-12-15 | End: 2018-12-17 | Stop reason: HOSPADM

## 2018-12-15 RX ORDER — POTASSIUM CHLORIDE 20 MEQ/1
20 TABLET, EXTENDED RELEASE ORAL DAILY
Status: DISCONTINUED | OUTPATIENT
Start: 2018-12-15 | End: 2018-12-16

## 2018-12-15 RX ORDER — MAGNESIUM SULFATE HEPTAHYDRATE 40 MG/ML
2 INJECTION, SOLUTION INTRAVENOUS ONCE
Status: COMPLETED | OUTPATIENT
Start: 2018-12-15 | End: 2018-12-15

## 2018-12-15 RX ADMIN — METHYLPREDNISOLONE SODIUM SUCCINATE 62.5 MG: 125 INJECTION, POWDER, FOR SOLUTION INTRAMUSCULAR; INTRAVENOUS at 17:58

## 2018-12-15 RX ADMIN — BUDESONIDE AND FORMOTEROL FUMARATE DIHYDRATE 2 PUFF: 80; 4.5 AEROSOL RESPIRATORY (INHALATION) at 20:17

## 2018-12-15 RX ADMIN — METHYLPREDNISOLONE SODIUM SUCCINATE 62.5 MG: 125 INJECTION, POWDER, FOR SOLUTION INTRAMUSCULAR; INTRAVENOUS at 12:22

## 2018-12-15 RX ADMIN — IPRATROPIUM BROMIDE AND ALBUTEROL SULFATE 3 ML: .5; 3 SOLUTION RESPIRATORY (INHALATION) at 22:23

## 2018-12-15 RX ADMIN — MAGNESIUM SULFATE IN WATER 2 G: 40 INJECTION, SOLUTION INTRAVENOUS at 15:57

## 2018-12-15 RX ADMIN — IPRATROPIUM BROMIDE AND ALBUTEROL SULFATE 3 ML: .5; 3 SOLUTION RESPIRATORY (INHALATION) at 10:48

## 2018-12-15 RX ADMIN — BUDESONIDE AND FORMOTEROL FUMARATE DIHYDRATE 2 PUFF: 80; 4.5 AEROSOL RESPIRATORY (INHALATION) at 07:45

## 2018-12-15 RX ADMIN — MAGNESIUM GLUCONATE 500 MG ORAL TABLET 400 MG: 500 TABLET ORAL at 17:54

## 2018-12-15 RX ADMIN — IPRATROPIUM BROMIDE AND ALBUTEROL SULFATE 3 ML: .5; 3 SOLUTION RESPIRATORY (INHALATION) at 00:55

## 2018-12-15 RX ADMIN — INFLUENZA A VIRUS A/MICHIGAN/45/2015 X-275 (H1N1) ANTIGEN (FORMALDEHYDE INACTIVATED), INFLUENZA A VIRUS A/SINGAPORE/INFIMH-16-0019/2016 IVR-186 (H3N2) ANTIGEN (FORMALDEHYDE INACTIVATED), INFLUENZA B VIRUS B/PHUKET/3073/2013 ANTIGEN (FORMALDEHYDE INACTIVATED), AND INFLUENZA B VIRUS B/MARYLAND/15/2016 BX-69A ANTIGEN (FORMALDEHYDE INACTIVATED) 0.5 ML: 15; 15; 15; 15 INJECTION, SUSPENSION INTRAMUSCULAR at 12:24

## 2018-12-15 RX ADMIN — IPRATROPIUM BROMIDE AND ALBUTEROL SULFATE 3 ML: .5; 3 SOLUTION RESPIRATORY (INHALATION) at 14:52

## 2018-12-15 RX ADMIN — IPRATROPIUM BROMIDE AND ALBUTEROL SULFATE 3 ML: .5; 3 SOLUTION RESPIRATORY (INHALATION) at 18:27

## 2018-12-15 RX ADMIN — IPRATROPIUM BROMIDE AND ALBUTEROL SULFATE 3 ML: .5; 3 SOLUTION RESPIRATORY (INHALATION) at 07:45

## 2018-12-15 RX ADMIN — POTASSIUM CHLORIDE 20 MEQ: 1500 TABLET, EXTENDED RELEASE ORAL at 15:57

## 2018-12-15 RX ADMIN — PREDNISONE 40 MG: 20 TABLET ORAL at 05:14

## 2018-12-15 ASSESSMENT — ENCOUNTER SYMPTOMS
PSYCHIATRIC NEGATIVE: 1
FOCAL WEAKNESS: 0
FEVER: 0
DIAPHORESIS: 0
ABDOMINAL PAIN: 0
SPUTUM PRODUCTION: 0
SHORTNESS OF BREATH: 1
HEADACHES: 0
PALPITATIONS: 0
NECK PAIN: 0
SPUTUM PRODUCTION: 1
FLANK PAIN: 0
NAUSEA: 0
DIZZINESS: 0
WHEEZING: 1
EYES NEGATIVE: 1
MYALGIAS: 0
COUGH: 1
BLURRED VISION: 0
SORE THROAT: 0
SEIZURES: 0
BACK PAIN: 0
CHILLS: 0
BLOOD IN STOOL: 0
BRUISES/BLEEDS EASILY: 0
DIARRHEA: 0
VOMITING: 0

## 2018-12-15 ASSESSMENT — PAIN SCALES - GENERAL
PAINLEVEL_OUTOF10: 0
PAINLEVEL_OUTOF10: 0

## 2018-12-15 ASSESSMENT — COPD QUESTIONNAIRES
DO YOU EVER COUGH UP ANY MUCUS OR PHLEGM?: NO/ONLY WITH OCCASIONAL COLDS OR INFECTIONS
COPD SCREENING SCORE: 2
HAVE YOU SMOKED AT LEAST 100 CIGARETTES IN YOUR ENTIRE LIFE: YES
DURING THE PAST 4 WEEKS HOW MUCH DID YOU FEEL SHORT OF BREATH: NONE/LITTLE OF THE TIME

## 2018-12-15 ASSESSMENT — PATIENT HEALTH QUESTIONNAIRE - PHQ9
1. LITTLE INTEREST OR PLEASURE IN DOING THINGS: NOT AT ALL
2. FEELING DOWN, DEPRESSED, IRRITABLE, OR HOPELESS: NOT AT ALL
SUM OF ALL RESPONSES TO PHQ9 QUESTIONS 1 AND 2: 0

## 2018-12-15 ASSESSMENT — LIFESTYLE VARIABLES: EVER_SMOKED: YES

## 2018-12-15 NOTE — H&P
Hospital Medicine History & Physical Note    Date of Service  12/14/2018    Primary Care Physician  OBED Power    Consultants  None    Code Status  Full code    Chief Complaint  Shortness of breath    History of Presenting Illness  28 y.o. male who presented 12/14/2018 with shortness of breath and wheezing that started earlier today.  The patient presented to [facility] with shortness of breath and wheezing for which he was treated with albuterol breathing treatments and given p.o. steroids.  He was then discharged home however his symptoms worsened and he returned to the ER.  Patient reports a dry cough and wheezing.  He denies any fevers or chills.  He denies chest pain, lightheadedness or diaphoresis.  The patient only uses a rescue inhaler as needed and has been prescribed Symbicort by his PCP however he has been unable to fill it.  He quit smoking 1 year ago.    EKG interpreted by me reveals sinus tachycardia with no ST elevation or ST depression  I have ordered chest x-ray which is pending at this time    Review of Systems  Review of Systems   Constitutional: Negative for chills, diaphoresis and fever.   HENT: Negative for hearing loss and sore throat.    Eyes: Negative for blurred vision.   Respiratory: Positive for cough, shortness of breath and wheezing. Negative for sputum production.    Cardiovascular: Negative for chest pain, palpitations and leg swelling.   Gastrointestinal: Negative for abdominal pain, blood in stool, diarrhea, nausea and vomiting.   Genitourinary: Negative for dysuria, flank pain and urgency.   Musculoskeletal: Negative for back pain, joint pain, myalgias and neck pain.   Skin: Negative for rash.   Neurological: Negative for dizziness, focal weakness, seizures and headaches.   Endo/Heme/Allergies: Does not bruise/bleed easily.   Psychiatric/Behavioral: Negative for suicidal ideas.   All other systems reviewed and are negative.      Past Medical  History   has a past medical history of Asthma.    Surgical History  No pertinent surgical hist    Family History  family history includes Heart Disease in his paternal grandmother.     Social History   reports that he quit smoking about 22 months ago. His smoking use included Cigarettes. He smoked 0.25 packs per day. He has never used smokeless tobacco. He reports that he drinks alcohol. He reports that he uses drugs, including Inhaled, Marijuana, and Oral.    Allergies  Allergies   Allergen Reactions   • Cat Hair Extract Hives, Shortness of Breath and Runny Nose     Itchy nose   • Mushroom Extract Complex Hives and Shortness of Breath       Medications  Prior to Admission Medications   Prescriptions Last Dose Informant Patient Reported? Taking?   acetaminophen (TYLENOL) 500 MG Tab 12/13/2018 at 1700 Patient Yes Yes   Sig: Take 500 mg by mouth every 6 hours as needed.   albuterol (PROVENTIL) 2.5mg/3ml Nebu Soln solution for nebulization 12/14/2018 at 1930 Patient No No   Sig: 3 mL by Nebulization route every four hours as needed for Shortness of Breath.   albuterol 108 (90 Base) MCG/ACT Aero Soln inhalation aerosol 12/14/2018 at 2015 Patient No No   Sig: Inhale 2 Puffs by mouth every four hours as needed for Shortness of Breath.   predniSONE (DELTASONE) 20 MG Tab 12/14/2018 at 1030 Patient Yes Yes   Sig: Take 60 mg by mouth every day. 4 day course      Facility-Administered Medications: None       Physical Exam  Temp:  [36.5 °C (97.7 °F)-37.2 °C (99 °F)] 36.5 °C (97.7 °F)  Pulse:  [] 114  Resp:  [18-24] 22  BP: (106-147)/(70-85) 106/85    Physical Exam   Constitutional: He is oriented to person, place, and time. He appears well-developed and well-nourished. No distress.   HENT:   Head: Normocephalic and atraumatic.   Mouth/Throat: Oropharynx is clear and moist.   Eyes: Pupils are equal, round, and reactive to light. Conjunctivae are normal. No scleral icterus.   Neck: Normal range of motion. Neck supple.    Cardiovascular: Regular rhythm and normal heart sounds.    Tachycardic   Pulmonary/Chest: He is in respiratory distress. He has wheezes. He has no rales.   Tachypneic  Diffuse end expiratory wheezing   Abdominal: Soft. Bowel sounds are normal. He exhibits no distension. There is no tenderness. There is no rebound.   Musculoskeletal: Normal range of motion. He exhibits no edema or tenderness.   Lymphadenopathy:     He has no cervical adenopathy.   Neurological: He is alert and oriented to person, place, and time. No cranial nerve deficit. Coordination normal.   Skin: Skin is warm. No rash noted.   Psychiatric: He has a normal mood and affect. His behavior is normal.   Nursing note and vitals reviewed.      Laboratory:          No results for input(s): ALTSGPT, ASTSGOT, ALKPHOSPHAT, TBILIRUBIN, DBILIRUBIN, GAMMAGT, AMYLASE, LIPASE, ALB, PREALBUMIN, GLUCOSE in the last 72 hours.              No results for input(s): TROPONINI in the last 72 hours.    Urinalysis:    No results found     Imaging:  No orders to display         Assessment/Plan:  I anticipate this patient is appropriate for observation status at this time.    Moderate persistent asthma with exacerbation- (present on admission)   Assessment & Plan    Patient has been started on IV Solu-Medrol, scheduled DuoNeb nebulized breathing treatment, RT protocol and supplemental oxygen  I started the patient on Symbicort  Rapid influenza negative       Acute respiratory failure with hypoxia (HCC)- (present on admission)   Assessment & Plan    Secondary to asthma exacerbation  Management as above         VTE prophylaxis: SCD

## 2018-12-15 NOTE — ED PROVIDER NOTES
ED Provider Note    HPI: Patient is a 28-year-old male who presented to the emergency department December 14, 2018 at 7:49 PM with a chief complaint of shortness of breath.    Patient was seen at another facility earlier today and was treated and discharged.  Patient at home became short of breath and gave himself an albuterol treatment.  He was still borderline hypoxic with a pulse oximetry of 90%.  Paramedics gave him 3 duo nebs with some improvement.  No fever or chills.  However he is still wheezing fairly vigorously no nausea no vomiting no abdominal pain no change in bladder or bowel habits.  No other somatic complaint    Review of Systems: Positive shortness of breath/wheezing negative for fever chills nausea vomiting abdominal pain change in bladder or bowel habits.  Review of systems reviewed with patient, all other systems negative    Past medical/surgical history: Asthma    Medications: Albuterol Symbicort    Allergies: Environmental allergies only    Social History: Patient quit smoking last year social use of alcohol      Physical exam: Constitutional: Slender male awake alert appears short of breath  Vital signs: Temperature 98.0 pulse 102 respirations 24 blood pressure 106/85 pulse oximetry 90% on room air 92% on 2 L  EYES: PERRL, EOMI, Conjunctivae and sclera normal, eyelids normal bilaterally.  Neck: Trachea midline. No cervical masses seen or palpated. Normal range of motion, supple. No meningeal signs elicited.  Cardiac: Tachycardic regular rate and rhythm. S1-S2 present. No S3 or S4 present. No murmurs, rubs, or gallops heard. No edema or varicosities were seen.   Lungs: Patient is somewhat tachypneic with scattered wheezing in all fields.  Patient's chest wall moved symmetrically with each respiratory effort. Patient was not making use of accessory muscles of respiration in breathing.  Abdomen: Soft nontender to palpation. No rebound or guarding elicited. No organomegaly identified. No pulsatile  abdominal masses identified.   Musculoskeletal:  no  pain with palpitation or movement of muscle, bone or joint , no obvious musculoskeletal deformities identified.  Neurologic: alert and awake answers questions appropriately. Moves all four extremities independently, no gross focal abnormalities identified. Normal strength and motor.  Skin: no rash or lesion seen, no palpable dermatologic lesions identified.  Tattoos noted.  Psychiatric: not anxious, delusional, or hallucinating.    Medical decision making: Patient was quite tachycardic on arrival.  He had received a couple of breathing treatments by the paramedics and he and I were both reluctant given further medications initially.  I reviewed the notes from earlier in the day.  The patient had received steroids earlier I did not repeat it.    Patient be admitted to observation for breathing treatments and further care.  He does not appear to have pneumonia given the absence of focal lung findings and fever.  His cough is productive of scant white sputum.  He does not appear to be septic or toxic.  He does appear to have failed outpatient treatment    Further care and hospital course per attending physician summary    Impression acute asthma exacerbation moderate persistent

## 2018-12-15 NOTE — ED TRIAGE NOTES
"Marcel Sandoval Marta  28 y.o. male  Chief Complaint   Patient presents with   • Shortness of Breath     seen at [facility] earlier d/c w/steroids after breathing tx states he did not feel adequate to be d/c home. Pt was found at 90% Room air after home albuterol tx. PTA [facility] gave 3 duoNebs now at 92%     /85   Pulse (!) 133 Comment: with Breathing tx all day  Temp 36.5 °C (97.7 °F) (Temporal)   Resp (!) 24   Ht 1.753 m (5' 9\")   Wt 61.2 kg (135 lb)   SpO2 92%   BMI 19.94 kg/m²    Blood Pressure: 106/85, Pulse: (!) 133 (with Breathing tx all day), Respiration: (!) 24, Temperature: 36.5 °C (97.7 °F), Height: 175.3 cm (5' 9\"), Weight: 61.2 kg (135 lb), BMI (Calculated): 19.94, BSA (Calculated): 1.7, Pulse Oximetry: 92 %, O2 (LPM): 2, O2 Delivery: Nasal Cannula  PT BIB [facility] for SOB, see cc. O2 placed 2 liters per NC now 94% with mild WOB. Coarse wheezing through out all lung fields. EKG done on arrival. VSS. Pt speaks clear sentences with pauses for breaths. No distress noted. Will cont to monitor pt.   Blood sent.   "

## 2018-12-15 NOTE — ED NOTES
Med rec complete per pt at bedside  Allergies have been verified and updated  No oral ABX within the last 30 days

## 2018-12-15 NOTE — PROGRESS NOTES
[facility] Medicine Daily Progress Note    Date of Service  12/15/2018    Chief Complaint  28 y.o. male admitted 12/14/2018 with asthma exacerbation    Hospital Course    .      Interval Problem Update  -Initially he refused chest x-ray stating he ordered it, but eventually he agreed.  Two-view x-ray pending  -Influenza negative  -Lungs very diminished throughout.  IV steroids, RT protocol, supplemental O2 to keep sats greater than 94%    Consultants/Specialty  NA    Code Status  FULL    Disposition  TBD    Review of Systems  Review of Systems   Constitutional: Positive for malaise/fatigue. Negative for chills and fever.   HENT: Negative.    Eyes: Negative.    Respiratory: Positive for cough, sputum production, shortness of breath and wheezing.    Cardiovascular: Negative for chest pain, palpitations and leg swelling.   Gastrointestinal: Negative for abdominal pain, nausea and vomiting.   Genitourinary: Negative.    Musculoskeletal: Negative for myalgias and neck pain.   Neurological: Negative for dizziness and headaches.   Endo/Heme/Allergies: Negative.    Psychiatric/Behavioral: Negative.    All other systems reviewed and are negative.       Physical Exam  Temp:  [36.5 °C (97.7 °F)-37.4 °C (99.3 °F)] 36.6 °C (97.9 °F)  Pulse:  [] 89  Resp:  [18-24] 18  BP: (106-145)/(63-85) 145/68    Physical Exam   Constitutional: He is oriented to person, place, and time. Vital signs are normal. He appears well-developed. He is cooperative.  Non-toxic appearance. He has a sickly appearance. No distress. Nasal cannula in place.   HENT:   Head: Normocephalic.   Right Ear: Hearing normal.   Left Ear: Hearing normal.   Nose: Nose normal.   Mouth/Throat: Oropharynx is clear and moist and mucous membranes are normal.   Eyes: Conjunctivae and EOM are normal. No scleral icterus.   Neck: Phonation normal. No JVD present.   Cardiovascular: Normal rate, normal heart sounds and intact distal pulses.    No murmur heard.  No edema  Cap  refill WNL   Pulmonary/Chest: Effort normal. No apnea. No respiratory distress. He has decreased breath sounds.   Very diminished throughout.  Faint scattered expiratory wheezes   Abdominal: Soft. Normal appearance. There is no tenderness. There is no rebound.   Musculoskeletal: Normal range of motion.   Neurological: He is alert and oriented to person, place, and time. He has normal strength. He displays a negative Romberg sign. GCS eye subscore is 4. GCS verbal subscore is 5. GCS motor subscore is 6.   CRONIN's 5/5   Skin: Skin is warm and dry. He is not diaphoretic. No cyanosis. Nails show no clubbing.   Psychiatric: He has a normal mood and affect. His speech is normal. Judgment normal. Cognition and memory are normal.   Nursing note and vitals reviewed.      Fluids  No intake or output data in the 24 hours ending 12/15/18 1227    Laboratory  Recent Labs      12/15/18   1119   WBC  21.0*   RBC  4.94   HEMOGLOBIN  15.0   HEMATOCRIT  44.0   MCV  89.1   MCH  30.4   MCHC  34.1   RDW  45.7   PLATELETCT  308   MPV  8.9*     Recent Labs      12/15/18   1119   SODIUM  141   POTASSIUM  3.6   CHLORIDE  111   CO2  21   GLUCOSE  114*   BUN  15   CREATININE  0.69   CALCIUM  8.5                   Imaging  DX-CHEST-2 VIEWS    (Results Pending)        Assessment/Plan  Moderate persistent asthma with exacerbation- (present on admission)   Assessment & Plan    -IV steroids  -check x-ray  -supplemental O2 to keep sats >94%  -inhalers  -RT protocol         Acute respiratory failure with hypoxia (HCC)- (present on admission)   Assessment & Plan    -asthma exacerbation  -(-) influenza  -failed OP tx with PO steroids - switched to IV  -initially he refused x-rays because he cannot afford them. Eventually he agreed to have 2-view done today  -RT protocol  -O2 to keep sats >94%       Hypokalemia   Assessment & Plan    -replacing  -check mag  -follow labs          VTE prophylaxis: Enoxaparin    OBED Stephenson

## 2018-12-15 NOTE — PROGRESS NOTES
Admission assessment complete.  Pt on 2L O2, saturation 90-93%.  Pt has mild expiatory and inspiratory wheezes in all fields.  Heart rate sinus tach on monitor rate .  Educated pt on plan for the day.  Girlfriend at bedside.  Attempting to get cot.  Box lunch given.  Spoke with RT in regards to pt, said that rt will be up to see pt.  Made pt aware.  Pt has no questions at this time.  Bed in low position.  Will monitor pt.

## 2018-12-15 NOTE — PROGRESS NOTES
Assessment completed. Pt A&Ox4. Mild breathing effort noted, dyspnea with exertion. Sating 94% on 2L n/c, desats to 84% with ambulation on RA. Inspiratory and expiratory wheezing throughout lungs. Moist cough noted. Pt denies pain at this time. Monitors applied, SR/ST(HR ), call light and belongings within reach. POC updated. Pt educated on room and call light, pt verbalized understanding. Communication board updated. Needs met.

## 2018-12-16 ENCOUNTER — PATIENT OUTREACH (OUTPATIENT)
Dept: HEALTH INFORMATION MANAGEMENT | Facility: OTHER | Age: 28
End: 2018-12-16

## 2018-12-16 LAB
ANION GAP SERPL CALC-SCNC: 10 MMOL/L (ref 0–11.9)
BUN SERPL-MCNC: 24 MG/DL (ref 8–22)
CALCIUM SERPL-MCNC: 9.6 MG/DL (ref 8.5–10.5)
CHLORIDE SERPL-SCNC: 104 MMOL/L (ref 96–112)
CO2 SERPL-SCNC: 25 MMOL/L (ref 20–33)
CREAT SERPL-MCNC: 0.84 MG/DL (ref 0.5–1.4)
GLUCOSE SERPL-MCNC: 138 MG/DL (ref 65–99)
MAGNESIUM SERPL-MCNC: 2.6 MG/DL (ref 1.5–2.5)
POTASSIUM SERPL-SCNC: 4.5 MMOL/L (ref 3.6–5.5)
SODIUM SERPL-SCNC: 139 MMOL/L (ref 135–145)

## 2018-12-16 PROCEDURE — 306637 HCHG MISC ORTHO ITEM RC 0274

## 2018-12-16 PROCEDURE — 83735 ASSAY OF MAGNESIUM: CPT

## 2018-12-16 PROCEDURE — 80048 BASIC METABOLIC PNL TOTAL CA: CPT

## 2018-12-16 PROCEDURE — 700102 HCHG RX REV CODE 250 W/ 637 OVERRIDE(OP): Performed by: NURSE PRACTITIONER

## 2018-12-16 PROCEDURE — G0378 HOSPITAL OBSERVATION PER HR: HCPCS

## 2018-12-16 PROCEDURE — 700102 HCHG RX REV CODE 250 W/ 637 OVERRIDE(OP): Performed by: FAMILY MEDICINE

## 2018-12-16 PROCEDURE — A9270 NON-COVERED ITEM OR SERVICE: HCPCS | Performed by: NURSE PRACTITIONER

## 2018-12-16 PROCEDURE — 96376 TX/PRO/DX INJ SAME DRUG ADON: CPT

## 2018-12-16 PROCEDURE — 700111 HCHG RX REV CODE 636 W/ 250 OVERRIDE (IP): Performed by: NURSE PRACTITIONER

## 2018-12-16 PROCEDURE — 700101 HCHG RX REV CODE 250: Performed by: INTERNAL MEDICINE

## 2018-12-16 PROCEDURE — 99232 SBSQ HOSP IP/OBS MODERATE 35: CPT | Performed by: FAMILY MEDICINE

## 2018-12-16 PROCEDURE — 94640 AIRWAY INHALATION TREATMENT: CPT

## 2018-12-16 PROCEDURE — A9270 NON-COVERED ITEM OR SERVICE: HCPCS | Performed by: FAMILY MEDICINE

## 2018-12-16 RX ADMIN — BUDESONIDE AND FORMOTEROL FUMARATE DIHYDRATE 2 PUFF: 80; 4.5 AEROSOL RESPIRATORY (INHALATION) at 20:08

## 2018-12-16 RX ADMIN — METHYLPREDNISOLONE SODIUM SUCCINATE 62.5 MG: 125 INJECTION, POWDER, FOR SOLUTION INTRAMUSCULAR; INTRAVENOUS at 18:00

## 2018-12-16 RX ADMIN — IPRATROPIUM BROMIDE AND ALBUTEROL SULFATE 3 ML: .5; 3 SOLUTION RESPIRATORY (INHALATION) at 08:05

## 2018-12-16 RX ADMIN — BUDESONIDE AND FORMOTEROL FUMARATE DIHYDRATE 2 PUFF: 80; 4.5 AEROSOL RESPIRATORY (INHALATION) at 08:10

## 2018-12-16 RX ADMIN — IPRATROPIUM BROMIDE AND ALBUTEROL SULFATE 3 ML: .5; 3 SOLUTION RESPIRATORY (INHALATION) at 19:09

## 2018-12-16 RX ADMIN — IPRATROPIUM BROMIDE AND ALBUTEROL SULFATE 3 ML: .5; 3 SOLUTION RESPIRATORY (INHALATION) at 16:38

## 2018-12-16 RX ADMIN — MAGNESIUM GLUCONATE 500 MG ORAL TABLET 400 MG: 500 TABLET ORAL at 05:19

## 2018-12-16 RX ADMIN — IPRATROPIUM BROMIDE AND ALBUTEROL SULFATE 3 ML: .5; 3 SOLUTION RESPIRATORY (INHALATION) at 22:45

## 2018-12-16 RX ADMIN — METHYLPREDNISOLONE SODIUM SUCCINATE 62.5 MG: 125 INJECTION, POWDER, FOR SOLUTION INTRAMUSCULAR; INTRAVENOUS at 00:19

## 2018-12-16 RX ADMIN — METHYLPREDNISOLONE SODIUM SUCCINATE 62.5 MG: 125 INJECTION, POWDER, FOR SOLUTION INTRAMUSCULAR; INTRAVENOUS at 05:20

## 2018-12-16 RX ADMIN — IPRATROPIUM BROMIDE AND ALBUTEROL SULFATE 3 ML: .5; 3 SOLUTION RESPIRATORY (INHALATION) at 11:49

## 2018-12-16 RX ADMIN — METHYLPREDNISOLONE SODIUM SUCCINATE 62.5 MG: 125 INJECTION, POWDER, FOR SOLUTION INTRAMUSCULAR; INTRAVENOUS at 13:17

## 2018-12-16 RX ADMIN — POTASSIUM CHLORIDE 20 MEQ: 1500 TABLET, EXTENDED RELEASE ORAL at 05:19

## 2018-12-16 RX ADMIN — IPRATROPIUM BROMIDE AND ALBUTEROL SULFATE 3 ML: .5; 3 SOLUTION RESPIRATORY (INHALATION) at 03:22

## 2018-12-16 ASSESSMENT — ENCOUNTER SYMPTOMS
COUGH: 1
ABDOMINAL PAIN: 0
BLURRED VISION: 0
WHEEZING: 1
HEARTBURN: 0
VOMITING: 0
HEADACHES: 0
WEAKNESS: 0
BACK PAIN: 0
NECK PAIN: 0
NAUSEA: 0
CHILLS: 0
DIARRHEA: 0
SORE THROAT: 0
NERVOUS/ANXIOUS: 0
DIZZINESS: 0
FEVER: 0
SHORTNESS OF BREATH: 0

## 2018-12-16 ASSESSMENT — PAIN SCALES - GENERAL
PAINLEVEL_OUTOF10: 0
PAINLEVEL_OUTOF10: 0

## 2018-12-16 ASSESSMENT — PATIENT HEALTH QUESTIONNAIRE - PHQ9
1. LITTLE INTEREST OR PLEASURE IN DOING THINGS: NOT AT ALL
SUM OF ALL RESPONSES TO PHQ9 QUESTIONS 1 AND 2: 0
2. FEELING DOWN, DEPRESSED, IRRITABLE, OR HOPELESS: NOT AT ALL

## 2018-12-16 NOTE — PROGRESS NOTES
Pt sleeping in bed.  Woke easily.  Am labs drawn and sent to lab.  No distress noted.  Will monitor

## 2018-12-16 NOTE — PROGRESS NOTES
Assessment completed. Pt A&Ox4. Mild breathing effort noted, strong and moist cough. Sating 94% on 2L n/c Inspiratory and expiratory wheezing, diminished since yesterday. Patient states he feels better. Pt denies pain at this time. Monitors applied, VS stable, call light and belongings within reach. POC updated. Pt educated on room and call light, pt verbalized understanding. Communication board updated. Needs met.

## 2018-12-17 VITALS
HEIGHT: 69 IN | DIASTOLIC BLOOD PRESSURE: 74 MMHG | SYSTOLIC BLOOD PRESSURE: 132 MMHG | RESPIRATION RATE: 17 BRPM | TEMPERATURE: 98.4 F | OXYGEN SATURATION: 91 % | HEART RATE: 119 BPM | BODY MASS INDEX: 18.84 KG/M2 | WEIGHT: 127.21 LBS

## 2018-12-17 LAB
ANION GAP SERPL CALC-SCNC: 10 MMOL/L (ref 0–11.9)
BASOPHILS # BLD AUTO: 0.1 % (ref 0–1.8)
BASOPHILS # BLD: 0.02 K/UL (ref 0–0.12)
BUN SERPL-MCNC: 28 MG/DL (ref 8–22)
CALCIUM SERPL-MCNC: 8.9 MG/DL (ref 8.5–10.5)
CHLORIDE SERPL-SCNC: 106 MMOL/L (ref 96–112)
CO2 SERPL-SCNC: 22 MMOL/L (ref 20–33)
CREAT SERPL-MCNC: 0.84 MG/DL (ref 0.5–1.4)
EOSINOPHIL # BLD AUTO: 0 K/UL (ref 0–0.51)
EOSINOPHIL NFR BLD: 0 % (ref 0–6.9)
ERYTHROCYTE [DISTWIDTH] IN BLOOD BY AUTOMATED COUNT: 45.3 FL (ref 35.9–50)
GLUCOSE SERPL-MCNC: 127 MG/DL (ref 65–99)
HCT VFR BLD AUTO: 43 % (ref 42–52)
HGB BLD-MCNC: 14.6 G/DL (ref 14–18)
IMM GRANULOCYTES # BLD AUTO: 0.14 K/UL (ref 0–0.11)
IMM GRANULOCYTES NFR BLD AUTO: 0.6 % (ref 0–0.9)
LYMPHOCYTES # BLD AUTO: 0.73 K/UL (ref 1–4.8)
LYMPHOCYTES NFR BLD: 3.3 % (ref 22–41)
MCH RBC QN AUTO: 30.3 PG (ref 27–33)
MCHC RBC AUTO-ENTMCNC: 34 G/DL (ref 33.7–35.3)
MCV RBC AUTO: 89.2 FL (ref 81.4–97.8)
MONOCYTES # BLD AUTO: 0.49 K/UL (ref 0–0.85)
MONOCYTES NFR BLD AUTO: 2.2 % (ref 0–13.4)
NEUTROPHILS # BLD AUTO: 20.68 K/UL (ref 1.82–7.42)
NEUTROPHILS NFR BLD: 93.8 % (ref 44–72)
NRBC # BLD AUTO: 0 K/UL
NRBC BLD-RTO: 0 /100 WBC
PLATELET # BLD AUTO: 307 K/UL (ref 164–446)
PMV BLD AUTO: 9.4 FL (ref 9–12.9)
POTASSIUM SERPL-SCNC: 4.1 MMOL/L (ref 3.6–5.5)
PROCALCITONIN SERPL-MCNC: 0.06 NG/ML
RBC # BLD AUTO: 4.82 M/UL (ref 4.7–6.1)
SODIUM SERPL-SCNC: 138 MMOL/L (ref 135–145)
WBC # BLD AUTO: 22.1 K/UL (ref 4.8–10.8)

## 2018-12-17 PROCEDURE — 80048 BASIC METABOLIC PNL TOTAL CA: CPT

## 2018-12-17 PROCEDURE — 85025 COMPLETE CBC W/AUTO DIFF WBC: CPT

## 2018-12-17 PROCEDURE — 700111 HCHG RX REV CODE 636 W/ 250 OVERRIDE (IP): Performed by: NURSE PRACTITIONER

## 2018-12-17 PROCEDURE — 99239 HOSP IP/OBS DSCHRG MGMT >30: CPT | Performed by: FAMILY MEDICINE

## 2018-12-17 PROCEDURE — 700101 HCHG RX REV CODE 250: Performed by: INTERNAL MEDICINE

## 2018-12-17 PROCEDURE — 96376 TX/PRO/DX INJ SAME DRUG ADON: CPT

## 2018-12-17 PROCEDURE — 84145 PROCALCITONIN (PCT): CPT

## 2018-12-17 PROCEDURE — 94640 AIRWAY INHALATION TREATMENT: CPT

## 2018-12-17 PROCEDURE — 36415 COLL VENOUS BLD VENIPUNCTURE: CPT

## 2018-12-17 RX ORDER — BUDESONIDE AND FORMOTEROL FUMARATE DIHYDRATE 80; 4.5 UG/1; UG/1
2 AEROSOL RESPIRATORY (INHALATION) 2 TIMES DAILY
Qty: 2 INHALER | Refills: 1 | Status: SHIPPED | OUTPATIENT
Start: 2018-12-17 | End: 2019-01-02 | Stop reason: SDUPTHER

## 2018-12-17 RX ORDER — PREDNISONE 20 MG/1
40 TABLET ORAL DAILY
Qty: 10 TAB | Refills: 0 | Status: SHIPPED | OUTPATIENT
Start: 2018-12-17 | End: 2018-12-22

## 2018-12-17 RX ADMIN — IPRATROPIUM BROMIDE AND ALBUTEROL SULFATE 3 ML: .5; 3 SOLUTION RESPIRATORY (INHALATION) at 07:00

## 2018-12-17 RX ADMIN — IPRATROPIUM BROMIDE AND ALBUTEROL SULFATE 3 ML: .5; 3 SOLUTION RESPIRATORY (INHALATION) at 02:58

## 2018-12-17 RX ADMIN — BUDESONIDE AND FORMOTEROL FUMARATE DIHYDRATE 2 PUFF: 80; 4.5 AEROSOL RESPIRATORY (INHALATION) at 07:01

## 2018-12-17 RX ADMIN — METHYLPREDNISOLONE SODIUM SUCCINATE 62.5 MG: 125 INJECTION, POWDER, FOR SOLUTION INTRAMUSCULAR; INTRAVENOUS at 06:31

## 2018-12-17 RX ADMIN — METHYLPREDNISOLONE SODIUM SUCCINATE 62.5 MG: 125 INJECTION, POWDER, FOR SOLUTION INTRAMUSCULAR; INTRAVENOUS at 00:09

## 2018-12-17 ASSESSMENT — PAIN SCALES - GENERAL
PAINLEVEL_OUTOF10: 0

## 2018-12-17 NOTE — PROGRESS NOTES
Assessment completed. Pt A&Ox4. Mild breathing effort noted, sating 91% on RA. Pt denies pain at this time. Monitors applied, call light and belongings within reach. POC updated. Pt educated on room and call light, pt verbalized understanding. Communication board updated. Needs met. Patient requesting to be discharged, states he feels better. Updated hospitalist.

## 2018-12-17 NOTE — PROGRESS NOTES
IV Dc 'd. Discharge instructions provided to patient. Pt verbalizes understanding. Pt states all questions have been answered. Copy of DC provided to patient. Signed copy in chart. 2 prescription sent to pharmacy. Pt states all personal belongings are in possession. Pt escorted off unit by this RN without incident. Refused w/c.

## 2018-12-17 NOTE — PROGRESS NOTES
[facility] Medicine Daily Progress Note    Date of Service  12/16/2018    Chief Complaint  28 y.o. male admitted 12/14/2018 with asthma exacerbation.    Hospital Course  Admitted with asthma exacerbation and respiratory failure.    Interval Problem Update  Asthma exacerbation - less wheezing, denies shortness of breath today  Respiratory failure -remains on O2    Consultants/Specialty  None    Code Status  Full    Disposition  May need home O2    Review of Systems  Review of Systems   Constitutional: Negative for chills, fever and malaise/fatigue.   HENT: Negative for hearing loss and sore throat.    Eyes: Negative for blurred vision.   Respiratory: Positive for cough and wheezing. Negative for shortness of breath.    Cardiovascular: Negative for chest pain and leg swelling.   Gastrointestinal: Negative for abdominal pain, diarrhea, heartburn, nausea and vomiting.   Genitourinary: Negative for dysuria.   Musculoskeletal: Negative for back pain and neck pain.   Skin: Negative for rash.   Neurological: Negative for dizziness, weakness and headaches.   Psychiatric/Behavioral: The patient is not nervous/anxious.         Physical Exam  Temp:  [36.7 °C (98 °F)-37.1 °C (98.7 °F)] 36.9 °C (98.5 °F)  Pulse:  [] 100  Resp:  [15-20] 16  BP: (120-129)/(58-67) 126/62    Physical Exam   Constitutional: He is oriented to person, place, and time. He appears well-developed and well-nourished.   HENT:   Head: Normocephalic and atraumatic.   Eyes: Pupils are equal, round, and reactive to light. Conjunctivae are normal.   Neck: No tracheal deviation present. No thyromegaly present.   Cardiovascular: Regular rhythm.  Tachycardia present.    Pulmonary/Chest: Effort normal. He has wheezes.   Fair air exchange   Abdominal: Soft. Bowel sounds are normal. He exhibits no distension. There is no tenderness.   Musculoskeletal: He exhibits no edema.   Lymphadenopathy:     He has no cervical adenopathy.   Neurological: He is alert and  oriented to person, place, and time.   Skin: Skin is warm and dry.   Nursing note and vitals reviewed.      Fluids  No intake or output data in the 24 hours ending 12/16/18 1700    Laboratory  Recent Labs      12/15/18   1119   WBC  21.0*   RBC  4.94   HEMOGLOBIN  15.0   HEMATOCRIT  44.0   MCV  89.1   MCH  30.4   MCHC  34.1   RDW  45.7   PLATELETCT  308   MPV  8.9*     Recent Labs      12/15/18   1119  12/16/18   0023   SODIUM  141  139   POTASSIUM  3.6  4.5   CHLORIDE  111  104   CO2  21  25   GLUCOSE  114*  138*   BUN  15  24*   CREATININE  0.69  0.84   CALCIUM  8.5  9.6                   Imaging  DX-CHEST-2 VIEWS   Final Result      Hyperinflation. No focal consolidation or pleural effusions.           Assessment/Plan  * Moderate persistent asthma with exacerbation- (present on admission)   Assessment & Plan    IV Solumedrol, Symbicort, RT protocol         Acute respiratory failure with hypoxia (HCC)- (present on admission)   Assessment & Plan    Keep O2 sats above 94%  Encourage IS, RT protocol       Hypomagnesemia- (present on admission)   Assessment & Plan    IV magnesium given     Hypokalemia- (present on admission)   Assessment & Plan    Stop K-Dur, follow BMP          VTE prophylaxis: Lovenox

## 2018-12-17 NOTE — PROGRESS NOTES
Received in bed, aox4, st hr 109  on monitor. Assessment as per CDU. Call light within reach. Needs attended. Plan of care discussed and understood.

## 2018-12-17 NOTE — DISCHARGE SUMMARY
Discharge Summary    CHIEF COMPLAINT ON ADMISSION  Chief Complaint   Patient presents with   • Shortness of Breath     seen at [facility] earlier d/c w/steroids after breathing tx states he did not feel adequate to be d/c home. Pt was found at 90% Room air after home albuterol tx. PTA PRAMOD gave 3 duoNebs now at 92%       Reason for Admission  EMS     Admission Date  12/14/2018    CODE STATUS  Prior    HPI & HOSPITAL COURSE  This is a 28 y.o. male here with asthma exacerbation, initially he was tried on oral prednisone, he was placed on Symbicort and RT protocol.  He also required O2 supplementation for respiratory failure.  However the oral prednisone failed to relieve most of his symptoms.  He was then placed on IV Solu-Medrol and there was clearly marked improvement of his symptoms.  Is been noncompliant with taking his Symbicort, states that his primary care physician has sent the prescription in but whenever he checks in with the pharmacy they say it is unavailable.  We have sent another prescription in for him.  Also counseled him on using the incentive spirometer at home.  He has been weaned off the oxygen at this point.       Therefore, he is discharged in good and stable condition to home with close outpatient follow-up.    The patient met 2-midnight criteria for an inpatient stay at the time of discharge.    Discharge Date  12/17/2018    FOLLOW UP ITEMS POST DISCHARGE  None    DISCHARGE DIAGNOSES  Principal Problem:    Moderate persistent asthma with exacerbation POA: Yes  Active Problems:    Acute respiratory failure with hypoxia (HCC) POA: Yes    Hypokalemia POA: Yes    Hypomagnesemia POA: Yes  Resolved Problems:    * No resolved hospital problems. *      FOLLOW UP  Future Appointments  Date Time Provider Department Center   12/21/2018 8:40 AM [facility] [address]     No follow-up provider specified.    MEDICATIONS ON DISCHARGE     Medication List      START taking these  medications      Instructions   budesonide-formoterol 80-4.5 MCG/ACT Aero  Commonly known as:  SYMBICORT   Inhale 2 Puffs by mouth 2 Times a Day.  Dose:  2 Puff        CHANGE how you take these medications      Instructions   predniSONE 20 MG Tabs  What changed:  · how much to take  · additional instructions  Commonly known as:  DELTASONE   Take 2 Tabs by mouth every day for 5 days.  Dose:  40 mg        CONTINUE taking these medications      Instructions   acetaminophen 500 MG Tabs  Commonly known as:  TYLENOL   Take 500 mg by mouth every 6 hours as needed.  Dose:  500 mg     * albuterol 2.5mg/3ml Nebu solution for nebulization  Commonly known as:  PROVENTIL   3 mL by Nebulization route every four hours as needed for Shortness of Breath.  Dose:  2.5 mg     * albuterol 108 (90 Base) MCG/ACT Aers inhalation aerosol   Inhale 2 Puffs by mouth every four hours as needed for Shortness of Breath.  Dose:  2 Puff        * This list has 2 medication(s) that are the same as other medications prescribed for you. Read the directions carefully, and ask your doctor or other care provider to review them with you.                Allergies  Allergies   Allergen Reactions   • Cat Hair Extract Hives, Shortness of Breath and Runny Nose     Itchy nose   • Mushroom Extract Complex Hives and Shortness of Breath       DIET  No orders of the defined types were placed in this encounter.      ACTIVITY  As tolerated.  Weight bearing as tolerated    CONSULTATIONS  None    PROCEDURES  None    LABORATORY  Lab Results   Component Value Date    SODIUM 138 12/17/2018    POTASSIUM 4.1 12/17/2018    CHLORIDE 106 12/17/2018    CO2 22 12/17/2018    GLUCOSE 127 (H) 12/17/2018    BUN 28 (H) 12/17/2018    CREATININE 0.84 12/17/2018        Lab Results   Component Value Date    WBC 22.1 (H) 12/17/2018    HEMOGLOBIN 14.6 12/17/2018    HEMATOCRIT 43.0 12/17/2018    PLATELETCT 307 12/17/2018        Total time of the discharge process exceeds 35 minutes.

## 2018-12-17 NOTE — DISCHARGE INSTRUCTIONS
Discharge Instructions    Discharged to home by car with self. Discharged via walking, hospital escort: Refused.  Special equipment needed: Not Applicable    Be sure to schedule a follow-up appointment with your primary care doctor or any specialists as instructed.     Discharge Plan:   Diet Plan: Discussed  Activity Level: Discussed  Confirmed Follow up Appointment: Patient to Call and Schedule Appointment  Confirmed Symptoms Management: Discussed  Medication Reconciliation Updated: Yes  Influenza Vaccine Indication: Indicated: 9 to 64 years of age  Influenza Vaccine Given - only chart on this line when given: Influenza Vaccine Given (See MAR)    I understand that a diet low in cholesterol, fat, and sodium is recommended for good health. Unless I have been given specific instructions below for another diet, I accept this instruction as my diet prescription.   Other diet: heart healthy    Special Instructions: None    · Is patient discharged on Warfarin / Coumadin?   No               Depression / Suicide Risk    As you are discharged from this [facility] facility, it is important to learn how to keep safe from harming yourself.    Recognize the warning signs:  · Abrupt changes in personality, positive or negative- including increase in energy   · Giving away possessions  · Change in eating patterns- significant weight changes-  positive or negative  · Change in sleeping patterns- unable to sleep or sleeping all the time   · Unwillingness or inability to communicate  · Depression  · Unusual sadness, discouragement and loneliness  · Talk of wanting to die  · Neglect of personal appearance   · Rebelliousness- reckless behavior  · Withdrawal from people/activities they love  · Confusion- inability to concentrate     If you or a loved one observes any of these behaviors or has concerns about self-harm, here's what you can do:  · Talk about it- your feelings and reasons for harming yourself  · Remove any means that you  might use to hurt yourself (examples: pills, rope, extension cords, firearm)  · Get professional help from the community (Mental Health, Substance Abuse, psychological counseling)  · Do not be alone:Call your Safe Contact- someone whom you trust who will be there for you.  · Call your local CRISIS HOTLINE 774-4719 or 519-048-3008  · Call your local Children's Mobile Crisis Response Team [place] (302) 589-3635 or www.[employer]  · Call the toll free National Suicide Prevention Hotlines   · National Suicide Prevention Lifeline 854-366-RPFM (8128)  · National Hope Line Network 800-SUICIDE (729-2104)

## 2018-12-18 ENCOUNTER — PATIENT OUTREACH (OUTPATIENT)
Dept: HEALTH INFORMATION MANAGEMENT | Facility: OTHER | Age: 28
End: 2018-12-18

## 2019-01-02 ENCOUNTER — OFFICE VISIT (OUTPATIENT)
Dept: MEDICAL GROUP | Facility: MEDICAL CENTER | Age: 29
End: 2019-01-02
Payer: COMMERCIAL

## 2019-01-02 VITALS
TEMPERATURE: 99.3 F | OXYGEN SATURATION: 98 % | WEIGHT: 139 LBS | BODY MASS INDEX: 20.59 KG/M2 | HEIGHT: 69 IN | DIASTOLIC BLOOD PRESSURE: 80 MMHG | HEART RATE: 82 BPM | RESPIRATION RATE: 16 BRPM | SYSTOLIC BLOOD PRESSURE: 140 MMHG

## 2019-01-02 DIAGNOSIS — J45.40 MODERATE PERSISTENT ASTHMA WITHOUT COMPLICATION: ICD-10-CM

## 2019-01-02 PROBLEM — J96.01 ACUTE RESPIRATORY FAILURE WITH HYPOXIA (HCC): Status: RESOLVED | Noted: 2018-12-15 | Resolved: 2019-01-02

## 2019-01-02 PROCEDURE — 99213 OFFICE O/P EST LOW 20 MIN: CPT | Performed by: NURSE PRACTITIONER

## 2019-01-02 RX ORDER — ALBUTEROL SULFATE 90 UG/1
2 AEROSOL, METERED RESPIRATORY (INHALATION) EVERY 4 HOURS PRN
Qty: 1 INHALER | Refills: 5 | Status: SHIPPED | OUTPATIENT
Start: 2019-01-02 | End: 2019-09-06 | Stop reason: SDUPTHER

## 2019-01-02 RX ORDER — BUDESONIDE AND FORMOTEROL FUMARATE DIHYDRATE 80; 4.5 UG/1; UG/1
2 AEROSOL RESPIRATORY (INHALATION) 2 TIMES DAILY
Qty: 2 INHALER | Refills: 11 | Status: SHIPPED | OUTPATIENT
Start: 2019-01-02 | End: 2019-11-18

## 2019-01-02 NOTE — PROGRESS NOTES
cc:  Follow up asthma attack      Subjective:     HPI:     Marcel Lozano is a 28 y.o. male here to discuss the evaluation and management of:    Moderate persistent asthma  Patient was recently admitted to the hospital for 3 days due to an acute asthma exacerbation.  Chest x-ray showed hyperinflation, no pneumonia.  His white count was elevated to 22 likely due to inflammatory response, he was also on oral steroids. He was discharged with Symbicort and a rescue inhaler.  He states he has been compliant with taking his Symbicort.  Patient states that he has not had to use his rescue inhaler and he only used his nebulizer last night for the first time since his discharge from the hospital.  Patient states he is also having his filters change in his apartment as they have not been changed in approximately 2 years.  He also states that he is cut down smoking on marijuana and has been exercising regularly.  This is been helpful for him.  Of note patient states that when the Symbicort was originally ordered for him back in July his pharmacy was unable to supply this medication for him.  He did not notify us of this.  Has not completed pulmonary function tests at this time.      ROS:  Denies any Headache, Blurred Vision, Confusion, Chest pain,  Shortness of breath,  Abdominal pain, Changes of bowel or bladder, Lower ext edema, Fevers, Nights sweats, Weight Changes, Focal weakness or numbness.  And all other systems are negative.        Current Outpatient Prescriptions:   •  budesonide-formoterol (SYMBICORT) 80-4.5 MCG/ACT Aerosol, Inhale 2 Puffs by mouth 2 Times a Day., Disp: 2 Inhaler, Rfl: 11  •  albuterol 108 (90 Base) MCG/ACT Aero Soln inhalation aerosol, Inhale 2 Puffs by mouth every four hours as needed for Shortness of Breath., Disp: 1 Inhaler, Rfl: 5  •  albuterol (PROVENTIL) 2.5mg/3ml Nebu Soln solution for nebulization, 3 mL by Nebulization route every four hours as needed for Shortness of Breath.,  "Disp: 30 Bullet, Rfl: 5  •  acetaminophen (TYLENOL) 500 MG Tab, Take 500 mg by mouth every 6 hours as needed., Disp: , Rfl:     Allergies   Allergen Reactions   • Cat Hair Extract Hives, Shortness of Breath and Runny Nose     Itchy nose   • Mushroom Extract Complex Hives and Shortness of Breath       Past Medical History:   Diagnosis Date   • Asthma    • Marijuana smoker      No past surgical history on file.  Family History   Problem Relation Age of Onset   • Heart Disease Paternal Grandmother      Social History     Social History   • Marital status: Single     Spouse name: N/A   • Number of children: N/A   • Years of education: N/A     Occupational History   • Not on file.     Social History Main Topics   • Smoking status: Former Smoker     Packs/day: 0.25     Types: Cigarettes     Quit date: 1/18/2017   • Smokeless tobacco: Never Used   • Alcohol use Yes      Comment: Occasionally   • Drug use: Yes     Types: Inhaled, Marijuana, Oral      Comment: quit smoking   • Sexual activity: Not on file     Other Topics Concern   • Not on file     Social History Narrative   • No narrative on file       Objective:     Vitals: /80   Pulse 82   Temp 37.4 °C (99.3 °F)   Resp 16   Ht 1.753 m (5' 9\")   Wt 63 kg (139 lb)   SpO2 98%   BMI 20.53 kg/m²    General: Alert, pleasant, NAD  HEENT: Normocephalic.   Heart: Regular rate and rhythm.  S1 and S2 normal.  No murmurs appreciated.  Respiratory: Normal respiratory effort.  Clear to auscultation bilaterally.  Skin: Warm, dry, no rashes.  Musculoskeletal: Gait is normal.  Moves all extremities well.  Extremities: No leg edema. No discoloration  Neurological: No tremors, sensation grossly intact, gait is normal,   Psych:  Affect/mood is normal, judgement is good, memory is intact, grooming is appropriate.    Assessment/Plan:     Marcel was seen today for follow-up.    Diagnoses and all orders for this visit:    Moderate persistent asthma without complication  Improved " since acute asthma exacerbation and hospital stay.  He is compliant with his Symbicort.  He has reduced use of his rescue inhaler and does not need he is cut down on his marijuana, he is exercising and he will be having the air filter changed in his apartment complex as they are well overdue.  Pulmonary function tests are needed.  He has also changed pharmacies and I have sent multiple refills for his rescue inhaler as well as his Symbicort so he has adequate supply.    -     albuterol 108 (90 Base) MCG/ACT Aero Soln inhalation aerosol; Inhale 2 Puffs by mouth every four hours as needed for Shortness of Breath.  -     budesonide-formoterol (SYMBICORT) 80-4.5 MCG/ACT Aerosol; Inhale 2 Puffs by mouth 2 Times a Day.          Return in about 6 months (around 7/2/2019).        Keyonna CLAY

## 2019-02-04 ENCOUNTER — OFFICE VISIT (OUTPATIENT)
Dept: MEDICAL GROUP | Facility: MEDICAL CENTER | Age: 29
End: 2019-02-04
Payer: COMMERCIAL

## 2019-02-04 VITALS
RESPIRATION RATE: 16 BRPM | TEMPERATURE: 97.5 F | HEART RATE: 69 BPM | HEIGHT: 69 IN | OXYGEN SATURATION: 99 % | DIASTOLIC BLOOD PRESSURE: 78 MMHG | WEIGHT: 145 LBS | SYSTOLIC BLOOD PRESSURE: 122 MMHG | BODY MASS INDEX: 21.48 KG/M2

## 2019-02-04 DIAGNOSIS — J45.41 MODERATE PERSISTENT ASTHMA WITH EXACERBATION: ICD-10-CM

## 2019-02-04 DIAGNOSIS — F41.1 GAD (GENERALIZED ANXIETY DISORDER): ICD-10-CM

## 2019-02-04 PROBLEM — E87.6 HYPOKALEMIA: Status: RESOLVED | Noted: 2018-12-15 | Resolved: 2019-02-04

## 2019-02-04 PROBLEM — E83.42 HYPOMAGNESEMIA: Status: RESOLVED | Noted: 2018-12-15 | Resolved: 2019-02-04

## 2019-02-04 PROCEDURE — 99213 OFFICE O/P EST LOW 20 MIN: CPT | Performed by: NURSE PRACTITIONER

## 2019-02-04 RX ORDER — MONTELUKAST SODIUM 10 MG/1
10 TABLET ORAL DAILY
Qty: 30 TAB | Refills: 3 | Status: SHIPPED | OUTPATIENT
Start: 2019-02-04 | End: 2020-02-18 | Stop reason: SDUPTHER

## 2019-02-04 NOTE — PROGRESS NOTES
cc:  Asthma follow up      Subjective:     HPI:     Marcel Lozano is a 28 y.o. male here to discuss the evaluation and management of:    1. Moderate persistent asthma with exacerbation  Patient states his been using his Symbicort twice a day.  He states that he also had the filters changed out in his apartment.  He does make note that he has reduced his use of his rescue inhaler.  He is also slowed on the marijuana edible's.  He does make note that when he is sitting and resting at home sometimes he feels wheezy for no apparent reason.  He also makes note when he goes over to his friend's house that he also has increased wheezing.  Has not had allergy testing.  Is not on any allergy medications.     2. CELIA (generalized anxiety disorder)  Patient states that he is been having some anxiety.  That he and his girlfriend have been trying to find a house and this is because anxiety.  He also is been dealing with custody disputes with his sister and the 2 children that he currently has custody of.  This is been a little bit stressful for him as he is concerned with the kids.  He states that he gets up and walks his dog for anxiety relief.  States this is effective for him.  He is not interested in starting any medications.      ROS:  Denies any Headache, Blurred Vision, Confusion, Chest pain,  Shortness of breath,  Abdominal pain, Changes of bowel or bladder, Lower ext edema, Fevers, Nights sweats, Weight Changes, Focal weakness or numbness.  And all other systems are negative.        Current Outpatient Prescriptions:   •  montelukast (SINGULAIR) 10 MG Tab, Take 1 Tab by mouth every day., Disp: 30 Tab, Rfl: 3  •  budesonide-formoterol (SYMBICORT) 80-4.5 MCG/ACT Aerosol, Inhale 2 Puffs by mouth 2 Times a Day., Disp: 2 Inhaler, Rfl: 11  •  albuterol (PROVENTIL) 2.5mg/3ml Nebu Soln solution for nebulization, 3 mL by Nebulization route every four hours as needed for Shortness of Breath., Disp: 30 Bullet, Rfl: 5  •   "albuterol 108 (90 Base) MCG/ACT Aero Soln inhalation aerosol, Inhale 2 Puffs by mouth every four hours as needed for Shortness of Breath., Disp: 1 Inhaler, Rfl: 5  •  acetaminophen (TYLENOL) 500 MG Tab, Take 500 mg by mouth every 6 hours as needed., Disp: , Rfl:     Allergies   Allergen Reactions   • Cat Hair Extract Hives, Shortness of Breath and Runny Nose     Itchy nose   • Mushroom Extract Complex Hives and Shortness of Breath       Past Medical History:   Diagnosis Date   • Asthma    • Marijuana smoker      No past surgical history on file.  Family History   Problem Relation Age of Onset   • Heart Disease Paternal Grandmother      Social History     Social History   • Marital status: Single     Spouse name: N/A   • Number of children: N/A   • Years of education: N/A     Occupational History   • Not on file.     Social History Main Topics   • Smoking status: Former Smoker     Packs/day: 0.25     Types: Cigarettes     Quit date: 1/18/2017   • Smokeless tobacco: Never Used   • Alcohol use Yes      Comment: Occasionally   • Drug use: Yes     Types: Inhaled, Marijuana, Oral      Comment: quit smoking   • Sexual activity: Not on file     Other Topics Concern   • Not on file     Social History Narrative   • No narrative on file       Objective:     Vitals: /78   Pulse 69   Temp 36.4 °C (97.5 °F)   Resp 16   Ht 1.753 m (5' 9\")   Wt 65.8 kg (145 lb)   SpO2 99%   BMI 21.41 kg/m²    General: Alert, pleasant, NAD  HEENT: Normocephalic.    Respiratory: Normal respiratory effort.  Clear to auscultation bilaterally.  Abdomen: Non-distended, soft, non-tender  Skin: Warm, dry, no rashes.  Musculoskeletal: Gait is normal.  Moves all extremities well.  Extremities: No leg edema. No discoloration  Neurological: No tremors, sensation grossly intact, gait is normal,   Psych:  Affect/mood is normal, judgement is good, memory is intact, grooming is appropriate.    Assessment/Plan:     Marcel was seen today for " follow-up.    Diagnoses and all orders for this visit:    Moderate persistent asthma with exacerbation  Improving.  No recent asthma exacerbations.  Has reduced his use of his rescue inhaler.  Filters were changed out of his apartment.  He is tolerating Symbicort twice a day.  He does have slight wheezing in the right middle lobe today.  States he is recently getting over upper respiratory infection.  Will refer over to allergy as he does make note that when he goes over to different friend's house he will have an exacerbation of his asthma.  He knows that he is allergic to cats.  We will also trial montelukast.  -     REFERRAL TO ALLERGY  -     montelukast (SINGULAIR) 10 MG Tab; Take 1 Tab by mouth every day.    CELIA (generalized anxiety disorder)  Stable.  No recent uncontrolled anxiety or panic attacks.  He does have anxiety surrounding the fact that trying to find a new house and the custody disputes that he is going here with his sister.  Not interested in medications at this time.  Continue walking his dog as this is very helpful with his anxiety reduction.                  Return if symptoms worsen or fail to improve.          Keyonna CLAY

## 2019-02-04 NOTE — LETTER
"February 4, 2019        Marcel Lori Lozano  [address]  [place] [address]        To Whom it May Concern Marcel Lzoano finds it beneficial to have his dog \"Marc\" with him as an emotional support animal.         Sincerely,        SHANNEN Power.    Electronically Signed     "

## 2019-06-02 RX ORDER — ALBUTEROL SULFATE 2.5 MG/3ML
SOLUTION RESPIRATORY (INHALATION)
Qty: 75 ML | Refills: 0 | Status: SHIPPED | OUTPATIENT
Start: 2019-06-02 | End: 2021-12-15

## 2019-10-13 DIAGNOSIS — J45.40 MODERATE PERSISTENT ASTHMA, UNSPECIFIED WHETHER COMPLICATED: ICD-10-CM

## 2019-10-14 RX ORDER — ALBUTEROL SULFATE 90 UG/1
2 AEROSOL, METERED RESPIRATORY (INHALATION) EVERY 6 HOURS PRN
Qty: 6.7 G | Refills: 6 | Status: SHIPPED | OUTPATIENT
Start: 2019-10-14 | End: 2019-11-18 | Stop reason: SDUPTHER

## 2019-10-16 ENCOUNTER — OFFICE VISIT (OUTPATIENT)
Dept: MEDICAL GROUP | Facility: MEDICAL CENTER | Age: 29
End: 2019-10-16
Payer: COMMERCIAL

## 2019-10-16 VITALS
HEIGHT: 69 IN | SYSTOLIC BLOOD PRESSURE: 122 MMHG | BODY MASS INDEX: 19.85 KG/M2 | DIASTOLIC BLOOD PRESSURE: 62 MMHG | RESPIRATION RATE: 16 BRPM | OXYGEN SATURATION: 97 % | TEMPERATURE: 98.4 F | WEIGHT: 134 LBS | HEART RATE: 67 BPM

## 2019-10-16 DIAGNOSIS — S29.011D MUSCLE STRAIN OF CHEST WALL, SUBSEQUENT ENCOUNTER: ICD-10-CM

## 2019-10-16 DIAGNOSIS — Z23 NEED FOR VACCINATION: ICD-10-CM

## 2019-10-16 PROCEDURE — 90715 TDAP VACCINE 7 YRS/> IM: CPT | Performed by: NURSE PRACTITIONER

## 2019-10-16 PROCEDURE — 90472 IMMUNIZATION ADMIN EACH ADD: CPT | Performed by: NURSE PRACTITIONER

## 2019-10-16 PROCEDURE — 90471 IMMUNIZATION ADMIN: CPT | Performed by: NURSE PRACTITIONER

## 2019-10-16 PROCEDURE — 90686 IIV4 VACC NO PRSV 0.5 ML IM: CPT | Performed by: NURSE PRACTITIONER

## 2019-10-16 PROCEDURE — 99213 OFFICE O/P EST LOW 20 MIN: CPT | Mod: 25 | Performed by: NURSE PRACTITIONER

## 2019-10-16 ASSESSMENT — PATIENT HEALTH QUESTIONNAIRE - PHQ9: CLINICAL INTERPRETATION OF PHQ2 SCORE: 0

## 2019-10-16 NOTE — PROGRESS NOTES
cc: Pain on left anterior chest      Subjective:     HPI:     Marcel Lozano is a 29 y.o. male here to discuss the evaluation and management of:    Patient presents today with pain in his left anterior chest and axillary region since this past Sunday.  Denies any trauma or injury.  States when he takes deep breath in it hurts and feels as if it is deep on the inside.  States he had some excessive coughing as he reports over the weekend he took a drink of something and it slightly choked.  He feels a tender area just below his clavicle and left side.  States is improving.  Has been some lidocaine on it.  Just wanted to get checked out.     ROS:  Denies any Headache, Blurred Vision, Confusion, Chest pain,  Shortness of breath,  Abdominal pain, Changes of bowel or bladder, Lower ext edema, Fevers, Nights sweats, Weight Changes, Focal weakness or numbness.  And all other systems are negative.  Left anterior chest muscle pain        Current Outpatient Medications:   •  albuterol (PROVENTIL HFA) 108 (90 Base) MCG/ACT Aero Soln inhalation aerosol, Inhale 2 Puffs by mouth every 6 hours as needed for Shortness of Breath., Disp: 6.7 g, Rfl: 6  •  PROVENTIL  (90 Base) MCG/ACT Aero Soln inhalation aerosol, INHALE 2 PUFFS BY MOUTH EVERY 4 HOURS AS NEEDED FOR SHORTNESS OF BREATH, Disp: 6.7 g, Rfl: 4  •  albuterol (PROVENTIL) 2.5mg/3ml Nebu Soln solution for nebulization, INHALE 3 ML BY MOUTH VIA NEBULIZER EVERY 4 HOURS AS NEEDED FOR SHORTNESS OF BREATH, Disp: 75 mL, Rfl: 0  •  montelukast (SINGULAIR) 10 MG Tab, Take 1 Tab by mouth every day., Disp: 30 Tab, Rfl: 3  •  budesonide-formoterol (SYMBICORT) 80-4.5 MCG/ACT Aerosol, Inhale 2 Puffs by mouth 2 Times a Day., Disp: 2 Inhaler, Rfl: 11  •  acetaminophen (TYLENOL) 500 MG Tab, Take 500 mg by mouth every 6 hours as needed., Disp: , Rfl:     Allergies   Allergen Reactions   • Cat Hair Extract Hives, Shortness of Breath and Runny Nose     Itchy nose   • Mushroom  "Extract Complex Hives and Shortness of Breath       Past Medical History:   Diagnosis Date   • Asthma    • Marijuana smoker      History reviewed. No pertinent surgical history.  Family History   Problem Relation Age of Onset   • Heart Disease Paternal Grandmother      Social History     Socioeconomic History   • Marital status: Single     Spouse name: Not on file   • Number of children: Not on file   • Years of education: Not on file   • Highest education level: Not on file   Occupational History   • Not on file   Social Needs   • Financial resource strain: Not on file   • Food insecurity:     Worry: Not on file     Inability: Not on file   • Transportation needs:     Medical: Not on file     Non-medical: Not on file   Tobacco Use   • Smoking status: Former Smoker     Packs/day: 0.25     Types: Cigarettes     Last attempt to quit: 2017     Years since quittin.7   • Smokeless tobacco: Never Used   Substance and Sexual Activity   • Alcohol use: Yes     Comment: Occasionally   • Drug use: Yes     Types: Inhaled, Marijuana, Oral     Comment: quit smoking   • Sexual activity: Not on file   Lifestyle   • Physical activity:     Days per week: Not on file     Minutes per session: Not on file   • Stress: Not on file   Relationships   • Social connections:     Talks on phone: Not on file     Gets together: Not on file     Attends [employer] service: Not on file     Active member of club or organization: Not on file     Attends meetings of clubs or organizations: Not on file     Relationship status: Not on file   • Intimate partner violence:     Fear of current or ex partner: Not on file     Emotionally abused: Not on file     Physically abused: Not on file     Forced sexual activity: Not on file   Other Topics Concern   • Not on file   Social History Narrative   • Not on file       Objective:     Vitals: /62   Pulse 67   Temp 36.9 °C (98.4 °F)   Resp 16   Ht 1.753 m (5' 9\")   Wt 60.8 kg (134 lb)   SpO2 " 97%   BMI 19.79 kg/m²    General: Alert, pleasant, NAD  HEENT: Normocephalic.   Heart: Regular rate and rhythm.  S1 and S2 normal.  No murmurs appreciated.  Respiratory: Normal respiratory effort.  Clear to auscultation bilaterally. No wheezing  Skin: Warm, dry, no rashes.  Musculoskeletal: tender to left infraclavicular area  Extremities: No leg edema. No discoloration  Neurological: No tremors  Psych:  Affect/mood is normal, judgement is good, memory is intact, grooming is appropriate.    Assessment/Plan:     Marcel was seen today for shoulder pain.    Diagnoses and all orders for this visit:    Muscle strain of chest wall, subsequent encounter  Recommend ice, heat, ibuprofen.  Patient is stable.  Follow-up as needed.    Need for vaccination  -     Influenza Vaccine Quad Injection (PF)  -     Tdap Vaccine =>8YO IM        Return if symptoms worsen or fail to improve.    {I have placed the above orders and discussed them with an approved delegating provider.  The MA is performing the below orders under the direction of Dr. Audi CLAY

## 2019-11-18 ENCOUNTER — APPOINTMENT (OUTPATIENT)
Dept: RADIOLOGY | Facility: MEDICAL CENTER | Age: 29
End: 2019-11-18
Attending: EMERGENCY MEDICINE
Payer: COMMERCIAL

## 2019-11-18 ENCOUNTER — HOSPITAL ENCOUNTER (EMERGENCY)
Facility: MEDICAL CENTER | Age: 29
End: 2019-11-18
Attending: EMERGENCY MEDICINE
Payer: COMMERCIAL

## 2019-11-18 VITALS
RESPIRATION RATE: 18 BRPM | TEMPERATURE: 98 F | OXYGEN SATURATION: 97 % | WEIGHT: 137.57 LBS | SYSTOLIC BLOOD PRESSURE: 106 MMHG | HEART RATE: 80 BPM | DIASTOLIC BLOOD PRESSURE: 69 MMHG | HEIGHT: 69 IN | BODY MASS INDEX: 20.38 KG/M2

## 2019-11-18 DIAGNOSIS — J45.40 MODERATE PERSISTENT ASTHMA, UNSPECIFIED WHETHER COMPLICATED: ICD-10-CM

## 2019-11-18 DIAGNOSIS — J45.20 MILD INTERMITTENT ASTHMA WITHOUT COMPLICATION: ICD-10-CM

## 2019-11-18 PROCEDURE — 94640 AIRWAY INHALATION TREATMENT: CPT

## 2019-11-18 PROCEDURE — 700102 HCHG RX REV CODE 250 W/ 637 OVERRIDE(OP): Performed by: EMERGENCY MEDICINE

## 2019-11-18 PROCEDURE — 71045 X-RAY EXAM CHEST 1 VIEW: CPT

## 2019-11-18 PROCEDURE — 700101 HCHG RX REV CODE 250: Performed by: EMERGENCY MEDICINE

## 2019-11-18 PROCEDURE — A9270 NON-COVERED ITEM OR SERVICE: HCPCS | Performed by: EMERGENCY MEDICINE

## 2019-11-18 PROCEDURE — 99284 EMERGENCY DEPT VISIT MOD MDM: CPT

## 2019-11-18 RX ORDER — PREDNISONE 50 MG/1
20 TABLET ORAL DAILY
Qty: 5 TAB | Refills: 0 | Status: SHIPPED | OUTPATIENT
Start: 2019-11-18 | End: 2021-12-15

## 2019-11-18 RX ORDER — AZITHROMYCIN 250 MG/1
TABLET, FILM COATED ORAL
Qty: 6 TAB | Refills: 0 | Status: SHIPPED | OUTPATIENT
Start: 2019-11-18 | End: 2021-12-15

## 2019-11-18 RX ORDER — ALBUTEROL SULFATE 90 UG/1
2 AEROSOL, METERED RESPIRATORY (INHALATION) EVERY 6 HOURS PRN
Qty: 6.7 G | Refills: 6 | Status: SHIPPED | OUTPATIENT
Start: 2019-11-18 | End: 2020-02-18 | Stop reason: SDUPTHER

## 2019-11-18 RX ORDER — ALBUTEROL SULFATE 90 UG/1
2 AEROSOL, METERED RESPIRATORY (INHALATION) EVERY 6 HOURS PRN
Status: SHIPPED | COMMUNITY
End: 2020-04-22 | Stop reason: SDUPTHER

## 2019-11-18 RX ORDER — DEXAMETHASONE 4 MG/1
12 TABLET ORAL ONCE
Status: COMPLETED | OUTPATIENT
Start: 2019-11-18 | End: 2019-11-18

## 2019-11-18 RX ADMIN — DEXAMETHASONE 12 MG: 4 TABLET ORAL at 20:35

## 2019-11-18 RX ADMIN — ALBUTEROL SULFATE 2.5 MG: 2.5 SOLUTION RESPIRATORY (INHALATION) at 20:43

## 2019-11-18 RX ADMIN — ALBUTEROL SULFATE 2.5 MG: 2.5 SOLUTION RESPIRATORY (INHALATION) at 21:05

## 2019-11-19 NOTE — FLOWSHEET NOTE
11/18/19 2106   RT Assessment of Delivered Medications   Evaluation of Medication Delivery Daily Yes-- Pt /Family has been Instructed in use of Respiratory Medications and Adverse Reactions   SVN Group   #SVN Performed Yes   Given By: Mouthpiece   Chest Exam   Respiration 16   Pulse 92   Breath Sounds   RUL Breath Sounds Expiratory Wheezes   RML Breath Sounds Expiratory Wheezes   RLL Breath Sounds Expiratory Wheezes   DREW Breath Sounds Expiratory Wheezes   LLL Breath Sounds Expiratory Wheezes;Inspiratory Wheezes   Secretions   Cough Moist;Non Productive

## 2019-11-19 NOTE — ED PROVIDER NOTES
ED Provider Note  CHIEF COMPLAINT  Chief Complaint   Patient presents with   • Asthma   • Cough       HPI  Marcel Lozano is a 29 y.o. male here for evaluation of expiratory wheezing and cough.  Patient states that this been ongoing for the last day, he does have history of asthma as well.  He used his home nebulizer and is on puffers without any relief, and he states that these usually will work in the past.  He has no vomiting, but does complain of a mildly productive cough.  He has some sinus congestion.  Is no ear pain, or neck pain or headache.  Patient denies any recent history of the same.  He does not smoke as well.  Nothing alleviates or exacerbates his symptoms since his nebulizer and puffers are not working.  He has not been on any recent steroids.  He states that prednisone has worked in the past.      ROS  See HPI for further details, o/w negative.     PAST MEDICAL HISTORY   has a past medical history of Asthma and Marijuana smoker.    SOCIAL HISTORY  Social History     Tobacco Use   • Smoking status: Former Smoker     Packs/day: 0.25     Types: Cigarettes     Last attempt to quit: 2017     Years since quittin.8   • Smokeless tobacco: Never Used   Substance and Sexual Activity   • Alcohol use: Yes     Comment: Occasionally   • Drug use: Yes     Types: Inhaled, Marijuana, Oral     Comment: quit smoking   • Sexual activity: Not on file       Family History  No bleeding disorders    SURGICAL HISTORY  patient denies any surgical history    CURRENT MEDICATIONS  Home Medications     Reviewed by Maria Teresa Coburn R.N. (Registered Nurse) on 19 at 1917  Med List Status: Not Addressed   Medication Last Dose Status   acetaminophen (TYLENOL) 500 MG Tab  Active   albuterol (PROVENTIL HFA) 108 (90 Base) MCG/ACT Aero Soln inhalation aerosol 2019 Active   albuterol (PROVENTIL) 2.5mg/3ml Nebu Soln solution for nebulization  Active   albuterol 108 (90 Base) MCG/ACT Aero Soln inhalation aerosol   Active   montelukast (SINGULAIR) 10 MG Tab 11/18/2019 Active   PROVENTIL  (90 Base) MCG/ACT Aero Soln inhalation aerosol  Active                ALLERGIES  Allergies   Allergen Reactions   • Cat Hair Extract Hives, Shortness of Breath and Runny Nose     Itchy nose   • Mushroom Extract Complex Hives and Shortness of Breath       REVIEW OF SYSTEMS  See HPI for further details. Review of systems as above, otherwise all other systems are negative.     PHYSICAL EXAM  Constitutional: Well developed, well nourished.  Mild acute distress.  HEENT: Normocephalic, atraumatic. Posterior pharynx clear and moist.  Eyes:  EOMI. Normal sclera.  Neck: Supple, Full range of motion, nontender.  Chest/Pulmonary:   Expiratory wheeze all fields, equal expansion  Cardio: Regular rate and rhythm with no murmur.   Musculoskeletal: No deformity, no edema, neurovascular intact.   Neuro: Clear speech, appropriate, cooperative, cranial nerves II-XII grossly intact.  Psych: Normal mood and affect    DX-CHEST-PORTABLE (1 VIEW)   Final Result         1.  No focal infiltrates.   2.  Perihilar interstitial prominence and bronchial wall cuffing suggests bronchial inflammation, consider reactive airway disease versus viral bronchiolitis.            PROCEDURES     MEDICAL RECORD  I have reviewed patient's medical record and pertinent results are listed.    COURSE & MEDICAL DECISION MAKING  I have reviewed any medical record information, laboratory studies and radiographic results as noted above.      At this time, the patient is nontoxic-appearing, improved after the breathing treatments, and states he feels better.  He has been given a dose of steroids here, and we will send him home on a short course as well.  He will also have antibiotics secondary to his smoking habit.  I also refilled his inhalers.    If you have had any blood pressure issues while here in the emergency department, please see your doctor for a further evaluation or work  up.    Differential diagnoses include but not limited to: Asthma exacerbation, pneumonia, URI    This patient presents with asthma exacerbation.  At this time, I have counseled the patient/family regarding their medications, pain control, and follow up.  They will continue their medications, if any, as prescribed.  They will return immediately for any worsening symptoms and/or any other medical concerns.  They will see their doctor, or contact the doctor provided, in 1-2 days for follow up.       FINAL IMPRESSION  Asthma exacerbation, mild      Electronically signed by: Uri Lord, 11/18/2019 8:32 PM

## 2019-11-19 NOTE — ED TRIAGE NOTES
Pt ambulates to triage  Chief Complaint   Patient presents with   • Asthma   • Cough   productive cough, reports feeling congested, using home inhaler more frequently today  Pt A & 0 x 4, speech clear, ambulates well  Pt asked to wait in lobby, pt updated on triage process and pt asked to inform RN of any changes.

## 2019-11-19 NOTE — FLOWSHEET NOTE
11/18/19 2045   RT Assessment of Delivered Medications   Evaluation of Medication Delivery Daily Yes-- Pt /Family has been Instructed in use of Respiratory Medications and Adverse Reactions   SVN Group   #SVN Performed Yes   Given By: Mouthpiece   Date SVN Last Changed 11/18/19   Date SVN Next Change Due (Q 7 Days) 11/25/19   Chest Exam   Respiration 16   Pulse 80   Breath Sounds   RUL Breath Sounds Expiratory Wheezes;Inspiratory Wheezes   RML Breath Sounds Expiratory Wheezes;Inspiratory Wheezes   RLL Breath Sounds Expiratory Wheezes;Inspiratory Wheezes   DREW Breath Sounds Expiratory Wheezes;Inspiratory Wheezes   LLL Breath Sounds Expiratory Wheezes;Inspiratory Wheezes   Oxygen   Pulse Oximetry 94 %   O2 (LPM) 0

## 2020-02-18 ENCOUNTER — APPOINTMENT (OUTPATIENT)
Dept: RADIOLOGY | Facility: MEDICAL CENTER | Age: 30
End: 2020-02-18
Attending: EMERGENCY MEDICINE
Payer: COMMERCIAL

## 2020-02-18 ENCOUNTER — HOSPITAL ENCOUNTER (EMERGENCY)
Facility: MEDICAL CENTER | Age: 30
End: 2020-02-18
Attending: EMERGENCY MEDICINE
Payer: COMMERCIAL

## 2020-02-18 VITALS
SYSTOLIC BLOOD PRESSURE: 115 MMHG | RESPIRATION RATE: 18 BRPM | BODY MASS INDEX: 19.85 KG/M2 | WEIGHT: 134.04 LBS | TEMPERATURE: 98.2 F | OXYGEN SATURATION: 97 % | HEART RATE: 75 BPM | HEIGHT: 69 IN | DIASTOLIC BLOOD PRESSURE: 76 MMHG

## 2020-02-18 DIAGNOSIS — J45.40 MODERATE PERSISTENT ASTHMA, UNSPECIFIED WHETHER COMPLICATED: ICD-10-CM

## 2020-02-18 DIAGNOSIS — J45.21 MILD INTERMITTENT ASTHMA WITH EXACERBATION: ICD-10-CM

## 2020-02-18 PROCEDURE — 700111 HCHG RX REV CODE 636 W/ 250 OVERRIDE (IP): Performed by: EMERGENCY MEDICINE

## 2020-02-18 PROCEDURE — 94640 AIRWAY INHALATION TREATMENT: CPT

## 2020-02-18 PROCEDURE — 700101 HCHG RX REV CODE 250: Performed by: EMERGENCY MEDICINE

## 2020-02-18 PROCEDURE — 71045 X-RAY EXAM CHEST 1 VIEW: CPT

## 2020-02-18 PROCEDURE — 99284 EMERGENCY DEPT VISIT MOD MDM: CPT

## 2020-02-18 RX ORDER — PREDNISONE 20 MG/1
60 TABLET ORAL ONCE
Status: COMPLETED | OUTPATIENT
Start: 2020-02-18 | End: 2020-02-18

## 2020-02-18 RX ORDER — IPRATROPIUM BROMIDE AND ALBUTEROL SULFATE 2.5; .5 MG/3ML; MG/3ML
3 SOLUTION RESPIRATORY (INHALATION)
Status: DISCONTINUED | OUTPATIENT
Start: 2020-02-18 | End: 2020-02-19 | Stop reason: HOSPADM

## 2020-02-18 RX ORDER — MONTELUKAST SODIUM 10 MG/1
10 TABLET ORAL DAILY
Qty: 30 TAB | Refills: 3 | Status: SHIPPED
Start: 2020-02-18 | End: 2020-04-22 | Stop reason: SDUPTHER

## 2020-02-18 RX ORDER — ALBUTEROL SULFATE 90 UG/1
2 AEROSOL, METERED RESPIRATORY (INHALATION) EVERY 6 HOURS PRN
Qty: 6.7 G | Refills: 6 | Status: SHIPPED
Start: 2020-02-18 | End: 2021-12-15

## 2020-02-18 RX ORDER — PREDNISONE 20 MG/1
60 TABLET ORAL DAILY
Status: DISCONTINUED | OUTPATIENT
Start: 2020-02-19 | End: 2020-02-18

## 2020-02-18 RX ORDER — PREDNISONE 50 MG/1
20 TABLET ORAL DAILY
Qty: 5 TAB | Refills: 0 | Status: SHIPPED
Start: 2020-02-18 | End: 2020-05-11 | Stop reason: SDUPTHER

## 2020-02-18 RX ORDER — METHYLPREDNISOLONE SODIUM SUCCINATE 125 MG/2ML
125 INJECTION, POWDER, LYOPHILIZED, FOR SOLUTION INTRAMUSCULAR; INTRAVENOUS ONCE
Status: DISCONTINUED | OUTPATIENT
Start: 2020-02-18 | End: 2020-02-18

## 2020-02-18 RX ADMIN — IPRATROPIUM BROMIDE AND ALBUTEROL SULFATE 3 ML: .5; 3 SOLUTION RESPIRATORY (INHALATION) at 22:15

## 2020-02-18 RX ADMIN — ALBUTEROL SULFATE 2.5 MG: 5 SOLUTION RESPIRATORY (INHALATION) at 22:54

## 2020-02-18 RX ADMIN — PREDNISONE 60 MG: 20 TABLET ORAL at 23:30

## 2020-02-18 SDOH — HEALTH STABILITY: MENTAL HEALTH: HOW OFTEN DO YOU HAVE A DRINK CONTAINING ALCOHOL?: 2-3 TIMES A WEEK

## 2020-02-18 SDOH — HEALTH STABILITY: MENTAL HEALTH: HOW MANY STANDARD DRINKS CONTAINING ALCOHOL DO YOU HAVE ON A TYPICAL DAY?: 1 OR 2

## 2020-02-19 NOTE — ED TRIAGE NOTES
Chief Complaint   Patient presents with   • Asthma     Wheezing since last night; SOB that gets worse with movement while at home; inhaler (x20) & nebulizer (x3) not helping       Pt ambulatory to triage for above complaint. SOB noted in triage but pt is not in acute distress. Pt states that the breathing problems are worse when he is at home and better when he gets out of the house (pt had some work done on the crawl space under their house recently and thinks this could be related).    Pt is alert/oriented and follows commands. Pt speaking in full sentences and responds appropriately to questions.     Pt placed in lobby and educated on triage process. Pt encouraged to alert staff for any changes in condition.

## 2020-02-19 NOTE — ED PROVIDER NOTES
ED Provider Note    CHIEF COMPLAINT  Chief Complaint   Patient presents with   • Asthma     Wheezing since last night; SOB that gets worse with movement while at home; inhaler (x20) & nebulizer (x3) not helping       HPI  Marcel Lozano is a 29 y.o. male here for evaluation of wheezing and asthma exacerbation.  Patient states that he has been having trouble with the wheezing over the last day, and he states that he gave himself a breathing treatment that had minimal effect.  He then was using his albuterol inhaler for the remainder the day with little relief.  He has not been on any recent steroids.  He has no fever chills, no productive cough.  Patient has a lengthy history of asthma exacerbation, and this feels very same.    ROS;  Please see HPI  O/W negative     PAST MEDICAL HISTORY   has a past medical history of Anxiety, Asthma, and Marijuana smoker.    SOCIAL HISTORY  Social History     Tobacco Use   • Smoking status: Former Smoker     Packs/day: 0.25     Types: Cigarettes     Last attempt to quit: 1/18/2017     Years since quitting: 3.0   • Smokeless tobacco: Never Used   Substance and Sexual Activity   • Alcohol use: Yes     Frequency: 2-3 times a week     Drinks per session: 1 or 2   • Drug use: Yes     Types: Inhaled, Marijuana, Oral     Comment: Marijuana occassionally (1x/month)   • Sexual activity: Not on file       SURGICAL HISTORY  patient denies any surgical history    CURRENT MEDICATIONS  Home Medications    **Home medications have not yet been reviewed for this encounter**         ALLERGIES  Allergies   Allergen Reactions   • Cat Hair Extract Hives, Shortness of Breath and Runny Nose     Itchy nose   • Mushroom Extract Complex Hives and Shortness of Breath       REVIEW OF SYSTEMS  See HPI for further details. Review of systems as above, otherwise all other systems are negative.     PHYSICAL EXAM  VITAL SIGNS: /77   Pulse 74   Temp 36.9 °C (98.4 °F) (Oral)   Resp 18   Ht 1.753 m  "(5' 9\")   Wt 60.8 kg (134 lb 0.6 oz)   SpO2 100%   BMI 19.79 kg/m²     Constitutional: Well developed, well nourished. No acute distress.  HEENT: Normocephalic, atraumatic. MMM  Neck: Supple, Full range of motion, no stridor  Chest/Pulmonary:   Expiratory wheeze noted, equal expansion  Musculoskeletal: No deformity, no edema, neurovascular intact.   Neuro: Clear speech, appropriate, cooperative, cranial nerves II-XII grossly intact.  Psych: Normal mood and affect    DX-CHEST-LIMITED (1 VIEW)   Final Result      Slight hyperinflation. No focal consolidation to suggest pneumonia.          PROCEDURES     MEDICAL RECORD  I have reviewed patient's medical record and pertinent results are listed.    COURSE & MEDICAL DECISION MAKING  I have reviewed any medical record information, laboratory studies and radiographic results as noted above.    The patient had improvement after his second breathing treatment here, and states that he feels much better.  His room air oxygen saturation is 100%.  He has been prescribed his steroids, and his inhalers.  He also agrees to follow-up in the next 1 to 2 days with his doctor.  He is afebrile, nontoxic-appearing, and medically compliant.    I you have had any blood pressure issues while here in the emergency department, please see your doctor for a further evaluation or work up.    Differential diagnoses include but not limited to: Asthma exacerbation, pneumonia, URI, bronchitis    This patient presents with asthma exacerbation.  At this time, I have counseled the patient/family regarding their medications, pain control, and follow up.  They will continue their medications, if any, as prescribed.  They will return immediately for any worsening symptoms and/or any other medical concerns.  They will see their doctor, or contact the doctor provided, in 1-2 days for follow up.       FINAL IMPRESSION  Asthma exacerbation      Electronically signed by: Uri Lord D.O., 2/18/2020 " 11:01 PM

## 2020-04-22 RX ORDER — MONTELUKAST SODIUM 10 MG/1
10 TABLET ORAL DAILY
Qty: 30 TAB | Refills: 3 | Status: SHIPPED | OUTPATIENT
Start: 2020-04-22 | End: 2021-01-24

## 2020-04-22 RX ORDER — ALBUTEROL SULFATE 90 UG/1
2 AEROSOL, METERED RESPIRATORY (INHALATION) EVERY 6 HOURS PRN
Qty: 8.5 G | Refills: 3 | Status: SHIPPED | OUTPATIENT
Start: 2020-04-22 | End: 2020-04-22 | Stop reason: SDUPTHER

## 2020-04-22 RX ORDER — ALBUTEROL SULFATE 90 UG/1
2 AEROSOL, METERED RESPIRATORY (INHALATION) EVERY 6 HOURS PRN
Qty: 8.5 G | Refills: 3 | Status: SHIPPED | OUTPATIENT
Start: 2020-04-22 | End: 2021-12-15

## 2020-05-11 ENCOUNTER — TELEMEDICINE (OUTPATIENT)
Dept: MEDICAL GROUP | Facility: MEDICAL CENTER | Age: 30
End: 2020-05-11
Payer: COMMERCIAL

## 2020-05-11 VITALS — WEIGHT: 140 LBS | HEIGHT: 69 IN | BODY MASS INDEX: 20.73 KG/M2 | RESPIRATION RATE: 14 BRPM | HEART RATE: 80 BPM

## 2020-05-11 DIAGNOSIS — J45.41 MODERATE PERSISTENT ASTHMA WITH EXACERBATION: ICD-10-CM

## 2020-05-11 PROCEDURE — 99214 OFFICE O/P EST MOD 30 MIN: CPT | Mod: 95,CR | Performed by: NURSE PRACTITIONER

## 2020-05-11 RX ORDER — PREDNISONE 50 MG/1
50 TABLET ORAL DAILY
Qty: 5 TAB | Refills: 0 | Status: SHIPPED | OUTPATIENT
Start: 2020-05-11 | End: 2020-05-16

## 2020-05-11 ASSESSMENT — FIBROSIS 4 INDEX: FIB4 SCORE: 0.46

## 2020-05-11 NOTE — LETTER
May 11, 2020        Marcel Lozano  [address] [address]      To Whom it May Concern:      Marcel Lozano is a current patient at this clinic. It has come to my attention that due to his diagnosis of Asthma he does require frequent assessment of his respiratory status especially during an exacerbation. During a flare up of symptoms it is best that he avoids extraneous activity such has heavy lifting, pushing or pulling and excessive walking.         Sincerely,        SHANNEN Power.    Electronically Signed

## 2020-05-11 NOTE — PROGRESS NOTES
Telemedicine Video Visit: Established Patient   This Remote Face to Face encounter was conducted via Zoom. Given the importance of social distancing and other strategies recommended to reduce the risk of COVID-19 transmission, I am providing medical care to this patient via audio/video visit in place of an in person visit at the request of the patient. Verbal consent to telehealth, risks, benefits, and consequences were discussed. Patient retains the right to withdraw at any time. All existing confidentiality protections apply. The patient has access to all transmitted medical information. No dissemination of any patient images or information to other entities without further written consent.  Subjective:     Chief Complaint   Patient presents with   • Asthma       Marcel Lozano is a 29 y.o. male presenting for evaluation and management of:    Presents today for symptoms of asthma exacerbation. States last Friday had noticed he was having chest heaviness and wheezing. Started at work and had a hard time continuing his work. Needed to rest. Has been using his inhaler twice daily even prior to this recent exacerbation.  No fever. Has been using his nebulizer at night as he wakes up wheezing. States that he never picked up the Symbicort again-last office visit for asthma was in February 2019 in which he was taking. Since his last office visit he stopped using marijuana. He does have three dogs, two of them sleep in his room at night. Has been taking Montelukast daily.    ROS wheezing, cough  Denies any recent fevers or chills. No nausea or vomiting. No chest pains or shortness of breath.     Allergies   Allergen Reactions   • Cat Hair Extract Hives, Shortness of Breath and Runny Nose     Itchy nose   • Mushroom Extract Complex Hives and Shortness of Breath       Current medicines (including changes today)  Current Outpatient Medications   Medication Sig Dispense Refill   • fluticasone-salmeterol (ADVAIR)  "100-50 MCG/DOSE AEROSOL POWDER, BREATH ACTIVATED Inhale 1 Puff by mouth 2 times a day. 1 Inhaler 3   • predniSONE (DELTASONE) 50 MG Tab Take 1 Tab by mouth every day for 5 days. 5 Tab 0   • albuterol 108 (90 Base) MCG/ACT Aero Soln inhalation aerosol Inhale 2 Puffs by mouth every 6 hours as needed for Shortness of Breath. 8.5 g 3   • montelukast (SINGULAIR) 10 MG Tab Take 1 Tab by mouth every day. 30 Tab 3   • albuterol (PROVENTIL HFA) 108 (90 Base) MCG/ACT Aero Soln inhalation aerosol Inhale 2 Puffs by mouth every 6 hours as needed for Shortness of Breath. 6.7 g 6   • predniSONE (DELTASONE) 50 MG Tab Take 1 Tab by mouth every day. 5 Tab 0   • azithromycin (ZITHROMAX) 250 MG Tab Take two tabs by mouth on day one, then one tab by mouth daily on days 2-5. 6 Tab 0   • PROVENTIL  (90 Base) MCG/ACT Aero Soln inhalation aerosol INHALE 2 PUFFS BY MOUTH EVERY 4 HOURS AS NEEDED FOR SHORTNESS OF BREATH 6.7 g 4   • albuterol (PROVENTIL) 2.5mg/3ml Nebu Soln solution for nebulization INHALE 3 ML BY MOUTH VIA NEBULIZER EVERY 4 HOURS AS NEEDED FOR SHORTNESS OF BREATH 75 mL 0   • acetaminophen (TYLENOL) 500 MG Tab Take 500 mg by mouth every 6 hours as needed.       No current facility-administered medications for this visit.        Patient Active Problem List    Diagnosis Date Noted   • Moderate persistent asthma with exacerbation 06/27/2018     Priority: High   • Decreased libido without sexual dysfunction 06/27/2018       Family History   Problem Relation Age of Onset   • Heart Disease Paternal Grandmother        He  has a past medical history of Anxiety, Asthma, and Marijuana smoker.  He  has no past surgical history on file.       Objective:   Vitals obtained by patient:  Pulse 80   Resp 14   Ht 1.753 m (5' 9\")   Wt 63.5 kg (140 lb)   BMI 20.67 kg/m²     Physical Exam:  Constitutional: Alert, no distress, well-groomed.  Skin: No rashes in visible areas.  Eye: Round. Conjunctiva clear, lids normal. No icterus.   ENMT: " Lips pink without lesions, good dentition, moist mucous membranes. Phonation normal.  Neck: No masses, no thyromegaly. Moves freely without pain.  CV: Pulse as reported by patient  Respiratory: Unlabored respiratory effort, no cough or audible wheeze  Psych: Alert and oriented x3, normal affect and mood.       Assessment and Plan:   The following treatment plan was discussed:     1. Moderate persistent asthma with exacerbation  New exacerbation. He does not appear to be in distress. has not been controlled for quite some time based on his reported use of his rescue inhaler. Have reviewed step up therapy. Rx for Prednisone x 5 days, using nebulizer. Then start Advair twice daily-rinse mouth after. Follow up 6 weeks. Emergent precautions discussed.     - fluticasone-salmeterol (ADVAIR) 100-50 MCG/DOSE AEROSOL POWDER, BREATH ACTIVATED; Inhale 1 Puff by mouth 2 times a day.  Dispense: 1 Inhaler; Refill: 3  - predniSONE (DELTASONE) 50 MG Tab; Take 1 Tab by mouth every day for 5 days.  Dispense: 5 Tab; Refill: 0        Follow-up: Return in about 6 weeks (around 6/22/2020).    Face to Face Video Visit:     [facility]

## 2020-10-14 ENCOUNTER — PATIENT MESSAGE (OUTPATIENT)
Dept: MEDICAL GROUP | Facility: MEDICAL CENTER | Age: 30
End: 2020-10-14

## 2020-10-14 NOTE — TELEPHONE ENCOUNTER
From: Marcel Lozano  To: [facility]  Sent: 10/14/2020 8:04 AM PDT  Subject: Prescription Question    Marito Guaman    I was wondering if you can send some refills over for the generic advair. I attached a picture above.     Also I don't wanted to let you know it does help tremendously! I never use my inhaler at all.

## 2021-01-06 ENCOUNTER — TELEMEDICINE (OUTPATIENT)
Dept: MEDICAL GROUP | Facility: MEDICAL CENTER | Age: 31
End: 2021-01-06
Payer: COMMERCIAL

## 2021-01-06 VITALS — BODY MASS INDEX: 21.48 KG/M2 | HEART RATE: 60 BPM | HEIGHT: 69 IN | TEMPERATURE: 97.8 F | WEIGHT: 145 LBS

## 2021-01-06 DIAGNOSIS — Z02.82 ENCOUNTER FOR PHYSICAL EXAMINATION OF PROSPECTIVE ADOPTIVE PARENT: ICD-10-CM

## 2021-01-06 DIAGNOSIS — J45.41 MODERATE PERSISTENT ASTHMA WITH EXACERBATION: Chronic | ICD-10-CM

## 2021-01-06 PROCEDURE — 99213 OFFICE O/P EST LOW 20 MIN: CPT | Mod: 95,CR | Performed by: NURSE PRACTITIONER

## 2021-01-06 ASSESSMENT — PATIENT HEALTH QUESTIONNAIRE - PHQ9: CLINICAL INTERPRETATION OF PHQ2 SCORE: 0

## 2021-01-06 NOTE — PROGRESS NOTES
Telemedicine Video Visit: Established Patient   This Remote Face to Face encounter was conducted via Zoom. Given the importance of social distancing and other strategies recommended to reduce the risk of COVID-19 transmission, I am providing medical care to this patient via audio/video visit in place of an in person visit at the request of the patient. Verbal consent to telehealth, risks, benefits, and consequences were discussed. Patient retains the right to withdraw at any time. All existing confidentiality protections apply. The patient has access to all transmitted medical information. No dissemination of any patient images or information to other entities without further written consent.  Subjective:     Chief Complaint   Patient presents with   • Paperwork     adoptive papers       Marcel Lozano is a 30 y.o. male presenting for evaluation and management of:    Asthma.   States the Advair has been very helpful for him. Has not had to use his inhaler as much.     Patient is planning to adopt his niece and his nephew.  Needs paperwork filled out.  He will fax this over or upload through my chart.  Mentions that his sister did recently terminate her parental rights in August 2020.  Has been caring for his niece who is 3-1/2 and his nephew who is 6 for quite some time.      ROS n/a  Denies any recent fevers or chills. No nausea or vomiting. No chest pains or shortness of breath.     Allergies   Allergen Reactions   • Cat Hair Extract Hives, Shortness of Breath and Runny Nose     Itchy nose   • Mushroom Extract Complex Hives and Shortness of Breath       Current medicines (including changes today)  Current Outpatient Medications   Medication Sig Dispense Refill   • fluticasone-salmeterol (ADVAIR) 100-50 MCG/DOSE AEROSOL POWDER, BREATH ACTIVATED Inhale 1 Puff by mouth 2 times a day. 60 Each 4   • montelukast (SINGULAIR) 10 MG Tab Take 1 Tab by mouth every day. 30 Tab 3   • albuterol (PROVENTIL HFA) 108 (90  "Base) MCG/ACT Aero Soln inhalation aerosol Inhale 2 Puffs by mouth every 6 hours as needed for Shortness of Breath. 6.7 g 6   • albuterol (PROVENTIL) 2.5mg/3ml Nebu Soln solution for nebulization INHALE 3 ML BY MOUTH VIA NEBULIZER EVERY 4 HOURS AS NEEDED FOR SHORTNESS OF BREATH 75 mL 0   • acetaminophen (TYLENOL) 500 MG Tab Take 500 mg by mouth every 6 hours as needed.     • albuterol 108 (90 Base) MCG/ACT Aero Soln inhalation aerosol Inhale 2 Puffs by mouth every 6 hours as needed for Shortness of Breath. (Patient not taking: Reported on 1/6/2021) 8.5 g 3   • predniSONE (DELTASONE) 50 MG Tab Take 1 Tab by mouth every day. (Patient not taking: Reported on 1/6/2021) 5 Tab 0   • azithromycin (ZITHROMAX) 250 MG Tab Take two tabs by mouth on day one, then one tab by mouth daily on days 2-5. (Patient not taking: Reported on 1/6/2021) 6 Tab 0   • PROVENTIL  (90 Base) MCG/ACT Aero Soln inhalation aerosol INHALE 2 PUFFS BY MOUTH EVERY 4 HOURS AS NEEDED FOR SHORTNESS OF BREATH (Patient not taking: Reported on 1/6/2021) 6.7 g 4     No current facility-administered medications for this visit.        Patient Active Problem List    Diagnosis Date Noted   • Moderate persistent asthma with exacerbation 06/27/2018     Priority: High   • Decreased libido without sexual dysfunction 06/27/2018       Family History   Problem Relation Age of Onset   • Heart Disease Paternal Grandmother        He  has a past medical history of Anxiety, Asthma, and Marijuana smoker.  He  has no past surgical history on file.       Objective:   Vitals obtained by patient:  Pulse 60   Temp 36.6 °C (97.8 °F) (Temporal)   Ht 1.753 m (5' 9\")   Wt 65.8 kg (145 lb)   BMI 21.41 kg/m²     Physical Exam:  Constitutional: Alert, no distress, well-groomed.  Skin: No rashes in visible areas.  Eye: Round. Conjunctiva clear, lids normal. No icterus.   ENMT: Lips pink without lesions, good dentition, moist mucous membranes. Phonation normal.  Neck: No masses, " no thyromegaly. Moves freely without pain.  CV: Pulse as reported by patient  Respiratory: Unlabored respiratory effort, no cough or audible wheeze  Psych: Alert and oriented x3, normal affect and mood.       Assessment and Plan:   The following treatment plan was discussed:     1. Moderate persistent asthma with exacerbation  Stable on Advair.  Continue.    2. Encounter for physical examination of prospective adoptive parent  Patient does not have any known communicable diseases and is emotionally stable for adoption.  Once paperwork has arrived will fill out in return back to patient.      Follow-up: No follow-ups on file.    Face to Face Video Visit:     [facility]

## 2021-01-24 RX ORDER — MONTELUKAST SODIUM 10 MG/1
TABLET ORAL
Qty: 30 TAB | Refills: 3 | Status: SHIPPED | OUTPATIENT
Start: 2021-01-24 | End: 2021-07-26

## 2021-05-23 NOTE — FLOWSHEET NOTE
03/14/17 0206   Events/Summary/Plan   Events/Summary/Plan SVN ER   Interdisciplinary Plan of Care-Goals (Indications)   Obstructive Ventilatory Defect or Pulmonary Disease without Obvious Obstruction History / Diagnosis   Interdisciplinary Plan of Care-Outcomes    Bronchodilator Outcome Diminished Wheezing and Volume of Air Movement Increased;Patient at Stable Baseline   Education   Education Yes - Pt. / Family has been Instructed in use of Respiratory Equipment;Yes - Pt. / Family has been Instructed in use of Respiratory Medications and Adverse Reactions   RT Assessment of Delivered Medications   Evaluation of Medication Delivery Daily Yes-- Pt /Family has been Instructed in use of Respiratory Medications and Adverse Reactions   SVN Group   #SVN Performed Yes   Given By: Mouthpiece   Date SVN Last Changed 03/14/17   Date SVN Next Change Due (Q 7 Days) 03/21/17   Respiratory WDL   Respiratory (WDL) X   Chest Exam   Respiration (!) 24   Pulse 76   Breath Sounds   Pre/Post Intervention Post Intervention Assessment   RUL Breath Sounds Inspiratory Wheezes;Expiratory Wheezes   RML Breath Sounds Expiratory Wheezes;Inspiratory Wheezes   RLL Breath Sounds Expiratory Wheezes;Inspiratory Wheezes   DREW Breath Sounds Expiratory Wheezes;Inspiratory Wheezes   LLL Breath Sounds Expiratory Wheezes;Inspiratory Wheezes   Oximetry   Continuous Oximetry Yes   Oxygen   Pulse Oximetry 98 %   O2 Daily Delivery Respiratory  Room Air with O2 Available   Room Air Challenge Pass      31

## 2021-06-15 RX ORDER — ALBUTEROL SULFATE 90 UG/1
2 AEROSOL, METERED RESPIRATORY (INHALATION) EVERY 6 HOURS PRN
Qty: 8.5 G | Refills: 3 | Status: SHIPPED | OUTPATIENT
Start: 2021-06-15 | End: 2022-05-04 | Stop reason: SDUPTHER

## 2021-07-26 RX ORDER — MONTELUKAST SODIUM 10 MG/1
TABLET ORAL
Qty: 30 TABLET | Refills: 6 | Status: SHIPPED | OUTPATIENT
Start: 2021-07-26 | End: 2022-05-04 | Stop reason: SDUPTHER

## 2021-12-15 ENCOUNTER — OFFICE VISIT (OUTPATIENT)
Dept: MEDICAL GROUP | Facility: MEDICAL CENTER | Age: 31
End: 2021-12-15
Payer: COMMERCIAL

## 2021-12-15 VITALS
BODY MASS INDEX: 21.48 KG/M2 | RESPIRATION RATE: 16 BRPM | HEIGHT: 69 IN | HEART RATE: 70 BPM | SYSTOLIC BLOOD PRESSURE: 118 MMHG | TEMPERATURE: 98.2 F | OXYGEN SATURATION: 97 % | DIASTOLIC BLOOD PRESSURE: 70 MMHG | WEIGHT: 145 LBS

## 2021-12-15 DIAGNOSIS — L50.9 URTICARIA: ICD-10-CM

## 2021-12-15 PROCEDURE — 99213 OFFICE O/P EST LOW 20 MIN: CPT | Performed by: STUDENT IN AN ORGANIZED HEALTH CARE EDUCATION/TRAINING PROGRAM

## 2021-12-15 RX ORDER — PREDNISONE 50 MG/1
50 TABLET ORAL DAILY
Qty: 5 TABLET | Refills: 0 | Status: SHIPPED | OUTPATIENT
Start: 2021-12-15 | End: 2023-05-18

## 2021-12-15 NOTE — PROGRESS NOTES
"Subjective:     CC: Hives    HPI:   Marcel presents today with    Hives  Patient presents today for hives x5 days.  Patient states he first noticed them when he woke up in the morning on his neck, chest, legs and lower back.  Patient with multiple small patches of erythema and pruritus.  Patient notes that Zyrtec has helped relieve itch and trying Benadryl lotion with some improvement.  Patient continues to note minimal spots of irritation.  Patient notes no new changes other than 2 weeks ago switching dryer sheets.  Patient notes no other symptoms.  Patient denies shortness of breath, nausea, vomiting, chest pain, and tongue swelling.        ROS:  Gen: no fevers/chills  Pulm: no sob, no cough  CV: no chest pain, no palpitations  GI: no nausea/vomiting, no diarrhea  Skin: + rash  Neuro: no headaches    Objective:     Exam:  /70 (BP Location: Left arm, Patient Position: Sitting, BP Cuff Size: Adult)   Pulse 70   Temp 36.8 °C (98.2 °F) (Temporal)   Resp 16   Ht 1.753 m (5' 9\")   Wt 65.8 kg (145 lb)   SpO2 97%   BMI 21.41 kg/m²  Body mass index is 21.41 kg/m².    Gen: Alert and oriented, No apparent distress.  Neck: Neck is supple without lymphadenopathy.  Lungs: Normal effort, CTA bilaterally, no wheezes, rhonchi, or rales  CV: Regular rate and rhythm. No murmurs, rubs, or gallops.  Skin: Diffuse erythematous pruritic papules    Assessment & Plan:     31 y.o. male with the following -     1. Urticaria  Patient presents with diffuse erythematous pruritic papules.  Patient notes that itching is improved with Zyrtec or antihistamine.  Patient encouraged to continue using antihistamine along with prednisone.  Discussed likely cause from contact dermatitis with dryer sheets.  Patient encouraged to change back to previous product.  Discussed red flag symptoms that would warrant emergent evaluation in the ED.  Patient encouraged to follow-up if no improvement.  - predniSONE (DELTASONE) 50 MG Tab; Take 1 Tablet " by mouth every day.  Dispense: 5 Tablet; Refill: 0    Return if symptoms worsen or fail to improve.    Please note that this dictation was created using voice recognition software. I have made every reasonable attempt to correct obvious errors, but I expect that there are errors of grammar and possibly content that I did not discover before finalizing the note.

## 2022-03-22 ENCOUNTER — OFFICE VISIT (OUTPATIENT)
Dept: MEDICAL GROUP | Facility: MEDICAL CENTER | Age: 32
End: 2022-03-22
Payer: COMMERCIAL

## 2022-03-22 VITALS
SYSTOLIC BLOOD PRESSURE: 108 MMHG | TEMPERATURE: 97.4 F | DIASTOLIC BLOOD PRESSURE: 52 MMHG | BODY MASS INDEX: 20.73 KG/M2 | HEART RATE: 78 BPM | WEIGHT: 139.99 LBS | HEIGHT: 69 IN | OXYGEN SATURATION: 98 %

## 2022-03-22 DIAGNOSIS — J30.89 ENVIRONMENTAL AND SEASONAL ALLERGIES: ICD-10-CM

## 2022-03-22 DIAGNOSIS — Z30.09 ENCOUNTER FOR VASECTOMY COUNSELING: ICD-10-CM

## 2022-03-22 DIAGNOSIS — L50.9 URTICARIA: ICD-10-CM

## 2022-03-22 DIAGNOSIS — J45.41 MODERATE PERSISTENT ASTHMA WITH EXACERBATION: Chronic | ICD-10-CM

## 2022-03-22 PROCEDURE — 99213 OFFICE O/P EST LOW 20 MIN: CPT | Performed by: NURSE PRACTITIONER

## 2022-03-22 ASSESSMENT — PATIENT HEALTH QUESTIONNAIRE - PHQ9: CLINICAL INTERPRETATION OF PHQ2 SCORE: 0

## 2022-03-22 NOTE — PROGRESS NOTES
Chief Complaint   Patient presents with   • Referral Needed     Urology to get vasectomy     Subjective:     HPI:     Marcel Dominguez is a 31 y.o. male   here to discuss the evaluation and management of:     1. Encounter for vasectomy counseling  Requesting referral to Urology for consultation.     2. Moderate persistent asthma with exacerbation  On montelukast, Wixela and rescue inhaler for this. Has not used rescue inhaler.     3. Urticaria  Has had hives somewhat recently.  Thinks it was due to changing out some dryer sheets.  He states he went back to the previous and this has improved although he occasionally will have some present on his legs and his lower back where his pant line lays.  Takes Zyrtec and Benadryl as well as Benadryl cream.  Not too bothered by this.  Have discussed possible referral to allergist.    4. Environmental and seasonal allergies  Taking Zyrtec and montelukast.  States Zyrtec is not as effective.  May consider alternating to Allegra every other month.    ROS:  Denies any Headache, Blurred Vision, Confusion, Chest pain,  Shortness of breath,  Abdominal pain, Changes of bowel or bladder, Lower ext edema, Fevers, Nights sweats, Weight Changes, Focal weakness or numbness.  And all other systems reviewed and are all negative. POSITIVE FOR : see above        Current Outpatient Medications:   •  WIXELA INHUB 100-50 MCG/DOSE AEROSOL POWDER, BREATH ACTIVATED, INHALE 1 PUFF BY MOUTH TWICE DAILY, Disp: 60 Each, Rfl: 4  •  predniSONE (DELTASONE) 50 MG Tab, Take 1 Tablet by mouth every day., Disp: 5 Tablet, Rfl: 0  •  montelukast (SINGULAIR) 10 MG Tab, TAKE 1 TABLET BY MOUTH EVERY DAY, Disp: 30 tablet, Rfl: 6  •  albuterol 108 (90 Base) MCG/ACT Aero Soln inhalation aerosol, INHALE 2 PUFFS BY MOUTH EVERY 6 HOURS AS NEEDED FOR SHORTNESS OF BREATH, Disp: 8.5 g, Rfl: 3    Allergies   Allergen Reactions   • Cat Hair Extract Hives, Shortness of Breath and Runny Nose     Itchy nose   • Mushroom  "Extract Complex Hives and Shortness of Breath       Past Medical History:   Diagnosis Date   • Anxiety    • Asthma    • Marijuana smoker      History reviewed. No pertinent surgical history.  Family History   Problem Relation Age of Onset   • Heart Disease Paternal Grandmother      Social History     Socioeconomic History   • Marital status: Single     Spouse name: Not on file   • Number of children: Not on file   • Years of education: Not on file   • Highest education level: Not on file   Occupational History   • Not on file   Tobacco Use   • Smoking status: Former Smoker     Packs/day: 0.25     Types: Cigarettes     Quit date: 2017     Years since quittin.1   • Smokeless tobacco: Never Used   Vaping Use   • Vaping Use: Former   Substance and Sexual Activity   • Alcohol use: Yes   • Drug use: Yes     Types: Inhaled, Marijuana, Oral     Comment: Marijuana occassionally (1x/month)   • Sexual activity: Not on file   Other Topics Concern   • Not on file   Social History Narrative   • Not on file     Social Determinants of Health     Financial Resource Strain: Not on file   Food Insecurity: Not on file   Transportation Needs: Not on file   Physical Activity: Not on file   Stress: Not on file   Social Connections: Not on file   Intimate Partner Violence: Not on file   Housing Stability: Not on file       Objective:     Vitals: /52 (BP Location: Right arm, Patient Position: Sitting, BP Cuff Size: Adult)   Pulse 78   Temp 36.3 °C (97.4 °F) (Temporal)   Ht 1.753 m (5' 9\")   Wt 63.5 kg (139 lb 15.9 oz)   SpO2 98%   BMI 20.67 kg/m²    General: Alert, pleasant, NAD  HEENT: Normocephalic.  Neck supple.   Respiratory: no distress, no audible wheezing, RR -WNL  Skin: Warm, dry, no rashes.  Extremities: No leg edema. No discoloration  Neurological: No tremors  Psych:  Affect/mood is normal, judgement is good, memory is intact, grooming is appropriate.    Assessment/Plan:     Marcel was seen today for referral " needed.    Diagnoses and all orders for this visit:    Encounter for vasectomy counseling  -     Referral to Urology    Moderate persistent asthma with exacerbation  Chronic, stable at this time.  No exacerbations.  Has not had to use his rescue here in quite some time.  Due for PFTs.  -     PULMONARY FUNCTION TESTS -Test requested: Complete Pulmonary Function Test; Include MIPS/MEPS? No; Future    Urticaria  Not present at this time although has episodes.  Recommend not changing out laundry soaps/lotions or body soaps frequently to reduce the risk.  Consider referral to allergist.    Environmental and seasonal allergies  Exacerbated at this time.  Recommend stop Zyrtec and start on Allegra and alternate every other month thereafter.  Consider referral to allergist.      No follow-ups on file.          Keyonna PRIDE.

## 2022-05-04 NOTE — TELEPHONE ENCOUNTER
Received request via: Pharmacy    Was the patient seen in the last year in this department? Yes    Does the patient have an active prescription (recently filled or refills available) for medication(s) requested? No     Pt would like 90 day supply for the listed prescriptions.  The rx for Montelukast Sodium 10Mg did not allow me to add that to the pending orders, but pt is also requesting this medication. Please advise.

## 2022-05-05 RX ORDER — MONTELUKAST SODIUM 10 MG/1
10 TABLET ORAL
Qty: 90 TABLET | Refills: 1 | Status: SHIPPED | OUTPATIENT
Start: 2022-05-05 | End: 2022-05-16 | Stop reason: SDUPTHER

## 2022-05-05 RX ORDER — FLUTICASONE PROPIONATE AND SALMETEROL 100; 50 UG/1; UG/1
1 POWDER RESPIRATORY (INHALATION) EVERY 12 HOURS
Qty: 60 EACH | Refills: 6 | Status: SHIPPED | OUTPATIENT
Start: 2022-05-05 | End: 2023-02-28

## 2022-05-05 RX ORDER — ALBUTEROL SULFATE 90 UG/1
2 AEROSOL, METERED RESPIRATORY (INHALATION) EVERY 6 HOURS PRN
Qty: 8.5 G | Refills: 6 | Status: SHIPPED | OUTPATIENT
Start: 2022-05-05 | End: 2022-05-16 | Stop reason: SDUPTHER

## 2022-05-16 RX ORDER — MONTELUKAST SODIUM 10 MG/1
10 TABLET ORAL
Qty: 90 TABLET | Refills: 2 | Status: SHIPPED | OUTPATIENT
Start: 2022-05-16 | End: 2023-02-28

## 2022-05-16 RX ORDER — ALBUTEROL SULFATE 90 UG/1
2 AEROSOL, METERED RESPIRATORY (INHALATION) EVERY 6 HOURS PRN
Qty: 3 EACH | Refills: 4 | Status: SHIPPED | OUTPATIENT
Start: 2022-05-16 | End: 2023-05-15 | Stop reason: SDUPTHER

## 2023-02-28 RX ORDER — MONTELUKAST SODIUM 10 MG/1
10 TABLET ORAL
Qty: 90 TABLET | Refills: 0 | Status: SHIPPED | OUTPATIENT
Start: 2023-02-28 | End: 2023-08-03

## 2023-02-28 RX ORDER — FLUTICASONE PROPIONATE AND SALMETEROL 100; 50 UG/1; UG/1
POWDER RESPIRATORY (INHALATION)
Qty: 180 EACH | Refills: 0 | Status: SHIPPED | OUTPATIENT
Start: 2023-02-28 | End: 2023-05-15 | Stop reason: SDUPTHER

## 2023-05-15 ENCOUNTER — PATIENT MESSAGE (OUTPATIENT)
Dept: MEDICAL GROUP | Facility: MEDICAL CENTER | Age: 33
End: 2023-05-15
Payer: COMMERCIAL

## 2023-05-16 RX ORDER — FLUTICASONE PROPIONATE AND SALMETEROL 100; 50 UG/1; UG/1
POWDER RESPIRATORY (INHALATION)
Qty: 60 EACH | Refills: 0 | Status: SHIPPED | OUTPATIENT
Start: 2023-05-16

## 2023-05-16 RX ORDER — ALBUTEROL SULFATE 90 UG/1
2 AEROSOL, METERED RESPIRATORY (INHALATION) EVERY 6 HOURS PRN
Qty: 3 EACH | Refills: 0 | Status: SHIPPED | OUTPATIENT
Start: 2023-05-16 | End: 2023-05-18 | Stop reason: SDUPTHER

## 2023-05-18 ENCOUNTER — TELEMEDICINE (OUTPATIENT)
Dept: MEDICAL GROUP | Facility: MEDICAL CENTER | Age: 33
End: 2023-05-18
Payer: COMMERCIAL

## 2023-05-18 DIAGNOSIS — J30.89 ENVIRONMENTAL AND SEASONAL ALLERGIES: ICD-10-CM

## 2023-05-18 DIAGNOSIS — J45.40 MODERATE PERSISTENT ASTHMA WITHOUT COMPLICATION: ICD-10-CM

## 2023-05-18 PROCEDURE — 99213 OFFICE O/P EST LOW 20 MIN: CPT | Mod: 95 | Performed by: NURSE PRACTITIONER

## 2023-05-18 RX ORDER — FLUTICASONE PROPIONATE AND SALMETEROL 100; 50 UG/1; UG/1
1 POWDER RESPIRATORY (INHALATION) EVERY 12 HOURS
Qty: 3 EACH | Refills: 3 | Status: SHIPPED | OUTPATIENT
Start: 2023-05-18 | End: 2024-05-12

## 2023-05-18 RX ORDER — ALBUTEROL SULFATE 2.5 MG/3ML
SOLUTION RESPIRATORY (INHALATION)
Qty: 75 ML | Refills: 1 | Status: SHIPPED | OUTPATIENT
Start: 2023-05-18

## 2023-05-18 RX ORDER — ALBUTEROL SULFATE 90 UG/1
2 AEROSOL, METERED RESPIRATORY (INHALATION) EVERY 6 HOURS PRN
Qty: 3 EACH | Refills: 6 | Status: SHIPPED | OUTPATIENT
Start: 2023-05-18

## 2023-05-18 ASSESSMENT — PATIENT HEALTH QUESTIONNAIRE - PHQ9: CLINICAL INTERPRETATION OF PHQ2 SCORE: 0

## 2023-05-18 NOTE — PROGRESS NOTES
Virtual Visit: Established Patient   This visit was conducted via Zoom using secure and encrypted videoconferencing technology.   The patient was in a private location outside of their home in the state of [place].    The patient's identity was confirmed and verbal consent was obtained for this virtual visit.    Subjective:   CC:   Chief Complaint   Patient presents with    Medication Refill     Marcel Dominguez is a 32 y.o. male presenting for evaluation and management of:    Last OV 3/2022    Patient presents today to follow-up on his asthma and request refills.  He reports that he has been doing very well and has not had any exacerbations.  Recently had some of his symptoms flareup however attributes to the frequent weather changes we have been having.  Tolerating the Advair and using rest inhaler as needed.  Has been able to work out several days a week.  He also mentions that he did have COVID a few months ago however he did not have any significant respiratory symptoms.  Overall doing very well.      ROS       Current medicines (including changes today)  Current Outpatient Medications   Medication Sig Dispense Refill    albuterol 108 (90 Base) MCG/ACT Aero Soln inhalation aerosol Inhale 2 Puffs every 6 hours as needed for Shortness of Breath. 3 Each 6    fluticasone-salmeterol (ADVAIR) 100-50 MCG/ACT AEROSOL POWDER, BREATH ACTIVATED Inhale 1 Puff every 12 hours for 360 days. 3 Each 3    albuterol (PROVENTIL) 2.5mg/3ml Nebu Soln solution for nebulization INHALE 3 ML BY MOUTH VIA NEBULIZER EVERY 4 HOURS AS NEEDED FOR SHORTNESS OF BREATH 75 mL 1    fluticasone-salmeterol (WIXELA INHUB) 100-50 MCG/ACT AEROSOL POWDER, BREATH ACTIVATED INHALE 1 PUFF BY MOUTH EVERY 12 HOURS 60 Each 0    montelukast (SINGULAIR) 10 MG Tab TAKE 1 TABLET BY MOUTH EVERY DAY 90 Tablet 0     No current facility-administered medications for this visit.       Patient Active Problem List    Diagnosis Date Noted    Urticaria 03/22/2022     Environmental and seasonal allergies 03/22/2022    Decreased libido without sexual dysfunction 06/27/2018    Moderate persistent asthma without complication 06/27/2018        Objective:   There were no vitals taken for this visit.    Physical Exam:  Constitutional: Alert, no distress, well-groomed.  Skin: No rashes in visible areas.  Eye: Round. Conjunctiva clear, lids normal. No icterus.   ENMT: Lips pink without lesions, good dentition, moist mucous membranes. Phonation normal.  Neck: No masses, no thyromegaly. Moves freely without pain.  Respiratory: Unlabored respiratory effort, no cough or audible wheeze  Psych: Alert and oriented x3, normal affect and mood.     Assessment and Plan:   The following treatment plan was discussed:     1. Moderate persistent asthma without complication  Chronic, stable, no frequent exacerbations.  Continue rescue inhaler as needed, Advair 100-50 twice daily.  May taper down when symptoms are controlled.  Continue routine exercise.  Have also sent him an as needed prescription for nebulizer of albuterol when he is suffering from an upper respiratory tract infection.  Follow-up annually if not sooner if needed.    2. Environmental and seasonal allergies  Chronic, stable.  Continue montelukast.        - albuterol 108 (90 Base) MCG/ACT Aero Soln inhalation aerosol; Inhale 2 Puffs every 6 hours as needed for Shortness of Breath.  Dispense: 3 Each; Refill: 6  - fluticasone-salmeterol (ADVAIR) 100-50 MCG/ACT AEROSOL POWDER, BREATH ACTIVATED; Inhale 1 Puff every 12 hours for 360 days.  Dispense: 3 Each; Refill: 3  - albuterol (PROVENTIL) 2.5mg/3ml Nebu Soln solution for nebulization; INHALE 3 ML BY MOUTH VIA NEBULIZER EVERY 4 HOURS AS NEEDED FOR SHORTNESS OF BREATH  Dispense: 75 mL; Refill: 1      Follow-up: No follow-ups on file.

## 2023-08-03 RX ORDER — MONTELUKAST SODIUM 10 MG/1
10 TABLET ORAL
Qty: 90 TABLET | Refills: 0 | Status: SHIPPED | OUTPATIENT
Start: 2023-08-03 | End: 2023-08-31

## 2023-08-31 RX ORDER — MONTELUKAST SODIUM 10 MG/1
10 TABLET ORAL
Qty: 90 TABLET | Refills: 0 | Status: SHIPPED | OUTPATIENT
Start: 2023-08-31

## 2023-08-31 RX ORDER — FLUTICASONE PROPIONATE AND SALMETEROL 100; 50 UG/1; UG/1
POWDER RESPIRATORY (INHALATION)
Qty: 180 EACH | Refills: 0 | Status: SHIPPED | OUTPATIENT
Start: 2023-08-31

## 2024-07-08 RX ORDER — ALBUTEROL SULFATE 90 UG/1
2 AEROSOL, METERED RESPIRATORY (INHALATION) EVERY 6 HOURS PRN
Qty: 25.5 G | Refills: 1 | Status: SHIPPED | OUTPATIENT
Start: 2024-07-08

## 2024-07-08 RX ORDER — MONTELUKAST SODIUM 10 MG/1
10 TABLET ORAL
Qty: 90 TABLET | Refills: 1 | Status: SHIPPED | OUTPATIENT
Start: 2024-07-08

## 2024-09-11 RX ORDER — FLUTICASONE PROPIONATE AND SALMETEROL 100; 50 UG/1; UG/1
POWDER RESPIRATORY (INHALATION)
Qty: 180 EACH | Refills: 0 | Status: SHIPPED | OUTPATIENT
Start: 2024-09-11

## 2025-01-24 RX ORDER — FLUTICASONE PROPIONATE AND SALMETEROL 100; 50 UG/1; UG/1
POWDER RESPIRATORY (INHALATION)
Qty: 180 EACH | Refills: 0 | Status: SHIPPED | OUTPATIENT
Start: 2025-01-24

## 2025-01-24 RX ORDER — CEPHALEXIN 250 MG/1
1 CAPSULE ORAL EVERY 12 HOURS
Qty: 180 EACH | Refills: 0 | Status: SHIPPED | OUTPATIENT
Start: 2025-01-24

## 2025-04-15 ENCOUNTER — OFFICE VISIT (OUTPATIENT)
Dept: MEDICAL GROUP | Facility: MEDICAL CENTER | Age: 35
End: 2025-04-15
Payer: COMMERCIAL

## 2025-04-15 VITALS
OXYGEN SATURATION: 97 % | RESPIRATION RATE: 14 BRPM | DIASTOLIC BLOOD PRESSURE: 58 MMHG | TEMPERATURE: 98.4 F | WEIGHT: 147.2 LBS | HEIGHT: 69 IN | BODY MASS INDEX: 21.8 KG/M2 | HEART RATE: 81 BPM | SYSTOLIC BLOOD PRESSURE: 124 MMHG

## 2025-04-15 DIAGNOSIS — Z11.4 SCREENING FOR HIV (HUMAN IMMUNODEFICIENCY VIRUS): ICD-10-CM

## 2025-04-15 DIAGNOSIS — Z13.6 SCREENING FOR CARDIOVASCULAR CONDITION: ICD-10-CM

## 2025-04-15 DIAGNOSIS — J30.89 ENVIRONMENTAL AND SEASONAL ALLERGIES: ICD-10-CM

## 2025-04-15 DIAGNOSIS — Z13.29 SCREENING FOR THYROID DISORDER: ICD-10-CM

## 2025-04-15 DIAGNOSIS — Z11.59 ENCOUNTER FOR HEPATITIS C SCREENING TEST FOR LOW RISK PATIENT: ICD-10-CM

## 2025-04-15 DIAGNOSIS — J45.40 MODERATE PERSISTENT ASTHMA WITHOUT COMPLICATION: ICD-10-CM

## 2025-04-15 DIAGNOSIS — Z23 NEED FOR VACCINATION: ICD-10-CM

## 2025-04-15 DIAGNOSIS — Z13.228 SCREENING FOR METABOLIC DISORDER: ICD-10-CM

## 2025-04-15 PROCEDURE — 90471 IMMUNIZATION ADMIN: CPT

## 2025-04-15 PROCEDURE — 3074F SYST BP LT 130 MM HG: CPT | Performed by: NURSE PRACTITIONER

## 2025-04-15 PROCEDURE — 3078F DIAST BP <80 MM HG: CPT | Performed by: NURSE PRACTITIONER

## 2025-04-15 PROCEDURE — 90677 PCV20 VACCINE IM: CPT

## 2025-04-15 PROCEDURE — 99214 OFFICE O/P EST MOD 30 MIN: CPT | Mod: 25 | Performed by: NURSE PRACTITIONER

## 2025-04-15 RX ORDER — MONTELUKAST SODIUM 10 MG/1
10 TABLET ORAL
Qty: 90 TABLET | Refills: 3 | Status: SHIPPED | OUTPATIENT
Start: 2025-04-15

## 2025-04-15 RX ORDER — FLUTICASONE PROPIONATE AND SALMETEROL 100; 50 UG/1; UG/1
1 POWDER RESPIRATORY (INHALATION) 2 TIMES DAILY
Qty: 180 EACH | Refills: 3 | Status: SHIPPED | OUTPATIENT
Start: 2025-04-15

## 2025-04-15 RX ORDER — FLUTICASONE PROPIONATE AND SALMETEROL 100; 50 UG/1; UG/1
1 POWDER RESPIRATORY (INHALATION) EVERY 12 HOURS
Qty: 180 EACH | Refills: 0 | Status: CANCELLED | OUTPATIENT
Start: 2025-04-15

## 2025-04-15 RX ORDER — ALBUTEROL SULFATE 90 UG/1
2 INHALANT RESPIRATORY (INHALATION) EVERY 6 HOURS PRN
Qty: 25.5 G | Refills: 11 | Status: SHIPPED | OUTPATIENT
Start: 2025-04-15

## 2025-04-15 ASSESSMENT — PATIENT HEALTH QUESTIONNAIRE - PHQ9: CLINICAL INTERPRETATION OF PHQ2 SCORE: 0

## 2025-04-15 NOTE — PROGRESS NOTES
Subjective:     Marcel Dominguez is a 34 y.o. male presents to discuss:     Chief Complaint   Patient presents with    Medication Refill    Follow-Up     Last OV 5/2023    Verbal consent was acquired by the patient to use [employer] ambient listening note generation during this visit Yes   History of Present Illness  The patient presents for evaluation of asthma and allergies.    Significant improvement in asthma symptoms is reported, with no current wheezing. An active lifestyle is maintained, including regular gym workouts and frequent stair climbing at his workplace. Experimentation with intermittent use of Wixela to assess symptom management without it has been satisfactory. Montelukast has not been used recently. Commitment to quitting smoking is noted, with current use limited to once a week. A spirometer is used for self-monitoring of respiratory function.    Seasonal allergies are attributed to constant exposure to graphite at work. Daily Allegra has been used to manage symptoms, but a decrease in effectiveness is noted. Previous relief from montelukast and occasional half-dose Benadryl is reported.    International travel to [facility] is planned for 09/2025, and advice on necessary vaccinations is sought.The last blood work was conducted during a hospital stay. Creatine monohydrate supplements are currently taken for weight gain and muscle building.    A vasectomy was performed in 04/2024.      ROS: : see above      Current Outpatient Medications:     albuterol 108 (90 Base) MCG/ACT Aero Soln inhalation aerosol, Inhale 2 Puffs every 6 hours as needed for Shortness of Breath., Disp: 25.5 g, Rfl: 11    montelukast (SINGULAIR) 10 MG Tab, Take 1 Tablet by mouth every day., Disp: 90 Tablet, Rfl: 3    fluticasone-salmeterol (WIXELA INHUB) 100-50 MCG/ACT AEROSOL POWDER, BREATH ACTIVATED, Inhale 1 Puff 2 times a day., Disp: 180 Each, Rfl: 3    fluticasone-salmeterol (ADVAIR DISKUS) 100-50 MCG/ACT AEROSOL  "POWDER, BREATH ACTIVATED, INHALE 1 PUFF BY MOUTH EVERY 12 HOURS, Disp: 180 Each, Rfl: 0    albuterol (PROVENTIL) 2.5mg/3ml Nebu Soln solution for nebulization, INHALE 3 ML BY MOUTH VIA NEBULIZER EVERY 4 HOURS AS NEEDED FOR SHORTNESS OF BREATH, Disp: 75 mL, Rfl: 1    Allergies   Allergen Reactions    Cat Hair Extract Hives, Shortness of Breath and Runny Nose     Itchy nose    Mushroom Extract Complex Hives and Shortness of Breath       Objective:   Vitals: /58   Pulse 81   Temp 36.9 °C (98.4 °F) (Temporal)   Resp 14   Ht 1.753 m (5' 9\")   Wt 66.8 kg (147 lb 3.2 oz)   SpO2 97%   BMI 21.74 kg/m²    General: Alert, pleasant, NAD  HEENT: Normocephalic.  Neck supple.   Respiratory: no distress, no audible wheezing, RR -WNL  Heart: HRR> no murmur  Skin: Warm, dry, no rashes.  Extremities: No leg edema. No discoloration  Neurological: No tremors  Psych:  Affect/mood is normal, judgement is good, memory is intact, grooming is appropriate.  Assessment/Plan:      1. Moderate persistent asthma without complication  - albuterol 108 (90 Base) MCG/ACT Aero Soln inhalation aerosol; Inhale 2 Puffs every 6 hours as needed for Shortness of Breath.  Dispense: 25.5 g; Refill: 11  - montelukast (SINGULAIR) 10 MG Tab; Take 1 Tablet by mouth every day.  Dispense: 90 Tablet; Refill: 3  - fluticasone-salmeterol (WIXELA INHUB) 100-50 MCG/ACT AEROSOL POWDER, BREATH ACTIVATED; Inhale 1 Puff 2 times a day.  Dispense: 180 Each; Refill: 3  - CBC WITH DIFFERENTIAL; Future    2. Environmental and seasonal allergies  - albuterol 108 (90 Base) MCG/ACT Aero Soln inhalation aerosol; Inhale 2 Puffs every 6 hours as needed for Shortness of Breath.  Dispense: 25.5 g; Refill: 11  - montelukast (SINGULAIR) 10 MG Tab; Take 1 Tablet by mouth every day.  Dispense: 90 Tablet; Refill: 3  - fluticasone-salmeterol (WIXELA INHUB) 100-50 MCG/ACT AEROSOL POWDER, BREATH ACTIVATED; Inhale 1 Puff 2 times a day.  Dispense: 180 Each; Refill: 3  - CBC WITH " DIFFERENTIAL; Future    3. Need for vaccination  - Pneumococcal Conjugate Vaccine 20-Valent (6 wks+)    4. Screening for cardiovascular condition  - Lipid Profile; Future    5. Screening for thyroid disorder  - TSH; Future    6. Screening for metabolic disorder  - Comp Metabolic Panel; Future    7. Encounter for hepatitis C screening test for low risk patient  - HEP C VIRUS ANTIBODY; Future    8. Screening for HIV (human immunodeficiency virus)  - HIV AG/AB COMBO ASSAY SCREENING; Future     Assessment & Plan  Asthma.  Chronic. This has been stable.   Reports feeling better than ever, with no wheezing and maintaining high activity levels.  Diagnostic plan: Pulmonary function tests considered -deferred for now.   Treatment plan: Advised to rinse face upon returning indoors and use nasal saline to cleanse nasal passages. Prescription for Wixela and rescue inhalers will be provided.    Seasonal allergies.  Chronic. Symptoms starting to present d/t current blooming.  Experiences seasonal allergies, likely exacerbated by work environment.  Treatment plan: Advised to restart montelukast.  Clinical decision making: Discussed the use of allergy shots, but deemed unnecessary at this time.    HEALTH MAINTENANCE: Health maintenance.  Advised to receive the Prevnar 20 vaccine today due to asthma history. Recommended to get the COVID-19 vaccine at a pharmacy, especially before international travel.  Diagnostic plan: Fasting blood work will be ordered to monitor overall health, including kidney function due to supplement use. HIV and Hepatitis C screenings will be conducted as part of routine health maintenance.    Return for Annual Preventative Exam.    {I have placed the above orders and discussed them with an approved delegating provider. The MA is performing the below orders under the direction of Dr. Teo CLAY    Health maintenance:    General Recommendations:   Smoking: recommend complete  avoidance of all tobacco products  Alcohol: recommend limiting consumption  Physical Activity: goal is 30 min of moderate activity 5 times a week  Weight Management and Nutrition: high vegetable, high protein diet like the Mediterranean diet, portion control, avoid excessive sugars, Low Glycemic Index foods, adequate hydration, sleep.